# Patient Record
Sex: MALE | Race: BLACK OR AFRICAN AMERICAN | NOT HISPANIC OR LATINO | Employment: OTHER | URBAN - METROPOLITAN AREA
[De-identification: names, ages, dates, MRNs, and addresses within clinical notes are randomized per-mention and may not be internally consistent; named-entity substitution may affect disease eponyms.]

---

## 2021-05-29 ENCOUNTER — RECORDS - HEALTHEAST (OUTPATIENT)
Dept: ADMINISTRATIVE | Facility: CLINIC | Age: 61
End: 2021-05-29

## 2021-06-09 ENCOUNTER — RECORDS - HEALTHEAST (OUTPATIENT)
Dept: ADMINISTRATIVE | Facility: CLINIC | Age: 61
End: 2021-06-09

## 2022-07-07 ENCOUNTER — MEDICAL CORRESPONDENCE (OUTPATIENT)
Dept: HEALTH INFORMATION MANAGEMENT | Facility: CLINIC | Age: 62
End: 2022-07-07

## 2022-07-08 ENCOUNTER — TRANSCRIBE ORDERS (OUTPATIENT)
Dept: OTHER | Age: 62
End: 2022-07-08

## 2022-07-08 DIAGNOSIS — H40.1133 PRIMARY OPEN ANGLE GLAUCOMA (POAG) OF BOTH EYES, SEVERE STAGE: Primary | ICD-10-CM

## 2022-08-09 DIAGNOSIS — H40.003 GLAUCOMA SUSPECT OF BOTH EYES: Primary | ICD-10-CM

## 2022-08-11 ENCOUNTER — OFFICE VISIT (OUTPATIENT)
Dept: OPHTHALMOLOGY | Facility: CLINIC | Age: 62
End: 2022-08-11
Attending: OPHTHALMOLOGY
Payer: COMMERCIAL

## 2022-08-11 DIAGNOSIS — H40.1133 PRIMARY OPEN ANGLE GLAUCOMA (POAG) OF BOTH EYES, SEVERE STAGE: ICD-10-CM

## 2022-08-11 DIAGNOSIS — H25.12 AGE-RELATED NUCLEAR CATARACT OF LEFT EYE: Primary | ICD-10-CM

## 2022-08-11 DIAGNOSIS — H54.40 VISUAL LOSS, ONE EYE, NO LIGHT PERCEPTION (NLP): ICD-10-CM

## 2022-08-11 PROBLEM — H54.414A: Status: ACTIVE | Noted: 2021-08-31

## 2022-08-11 PROBLEM — H40.9: Status: ACTIVE | Noted: 2022-06-12

## 2022-08-11 PROBLEM — M10.9 GOUT: Status: ACTIVE | Noted: 2020-02-03

## 2022-08-11 PROCEDURE — 76512 OPH US DX B-SCAN: CPT | Performed by: OPHTHALMOLOGY

## 2022-08-11 PROCEDURE — 92004 COMPRE OPH EXAM NEW PT 1/>: CPT | Mod: GC | Performed by: OPHTHALMOLOGY

## 2022-08-11 PROCEDURE — G0463 HOSPITAL OUTPT CLINIC VISIT: HCPCS | Mod: 25

## 2022-08-11 PROCEDURE — 92133 CPTRZD OPH DX IMG PST SGM ON: CPT | Performed by: OPHTHALMOLOGY

## 2022-08-11 PROCEDURE — 92083 EXTENDED VISUAL FIELD XM: CPT | Performed by: OPHTHALMOLOGY

## 2022-08-11 RX ORDER — BRIMONIDINE TARTRATE 2 MG/ML
1 SOLUTION/ DROPS OPHTHALMIC 3 TIMES DAILY
COMMUNITY
End: 2022-08-11

## 2022-08-11 RX ORDER — BRIMONIDINE TARTRATE 2 MG/ML
1 SOLUTION/ DROPS OPHTHALMIC 3 TIMES DAILY
Qty: 5 ML | Refills: 3 | Status: SHIPPED | OUTPATIENT
Start: 2022-08-11 | End: 2022-10-21

## 2022-08-11 RX ORDER — TIMOLOL MALEATE 5 MG/ML
1 SOLUTION/ DROPS OPHTHALMIC 2 TIMES DAILY
Qty: 5 ML | Refills: 3 | Status: SHIPPED | OUTPATIENT
Start: 2022-08-11 | End: 2023-01-19

## 2022-08-11 RX ORDER — TIMOLOL MALEATE 5 MG/ML
1 SOLUTION/ DROPS OPHTHALMIC 2 TIMES DAILY
COMMUNITY
End: 2022-08-11

## 2022-08-11 RX ORDER — LATANOPROST 50 UG/ML
1 SOLUTION/ DROPS OPHTHALMIC DAILY
Qty: 2.5 ML | Refills: 3 | Status: SHIPPED | OUTPATIENT
Start: 2022-08-11 | End: 2023-01-19

## 2022-08-11 RX ORDER — ACETAZOLAMIDE 500 MG/1
500 CAPSULE, EXTENDED RELEASE ORAL 2 TIMES DAILY
Qty: 60 CAPSULE | Refills: 0 | Status: SHIPPED | OUTPATIENT
Start: 2022-08-11 | End: 2022-09-19

## 2022-08-11 RX ORDER — ACETAZOLAMIDE 500 MG/1
500 CAPSULE, EXTENDED RELEASE ORAL
COMMUNITY
Start: 2022-06-12 | End: 2022-08-11

## 2022-08-11 RX ORDER — LATANOPROST 50 UG/ML
1 SOLUTION/ DROPS OPHTHALMIC
COMMUNITY
Start: 2022-06-12 | End: 2022-08-11

## 2022-08-11 ASSESSMENT — CONF VISUAL FIELD
OD_SUPERIOR_NASAL_RESTRICTION: 1
OS_SUPERIOR_TEMPORAL_RESTRICTION: 1
OD_INFERIOR_NASAL_RESTRICTION: 1
OS_INFERIOR_TEMPORAL_RESTRICTION: 3
OD_SUPERIOR_TEMPORAL_RESTRICTION: 1
OD_INFERIOR_TEMPORAL_RESTRICTION: 1
OS_INFERIOR_NASAL_RESTRICTION: 1
OS_SUPERIOR_NASAL_RESTRICTION: 1

## 2022-08-11 ASSESSMENT — REFRACTION_WEARINGRX
OS_CYLINDER: +3.00
OS_AXIS: 165
OD_SPHERE: BALANCE
SPECS_TYPE: BIFOCAL
OS_ADD: +3.00
OS_SPHERE: -3.00

## 2022-08-11 ASSESSMENT — PACHYMETRY
OS_CT(UM): 523
OD_CT(UM): 553

## 2022-08-11 ASSESSMENT — VISUAL ACUITY
OD_SC: NLP
OS_SC: 20/40
CORRECTION_TYPE: GLASSES
METHOD: SNELLEN - LINEAR

## 2022-08-11 ASSESSMENT — TONOMETRY
OD_IOP_MMHG: 3
OD_IOP_MMHG: 3
OS_IOP_MMHG: 40
OS_IOP_MMHG: 34
IOP_METHOD: TONOPEN
IOP_METHOD: APPLANATION (AN)
OS_IOP_MMHG: 38
OD_IOP_MMHG: 6
IOP_METHOD: TONOPEN

## 2022-08-11 ASSESSMENT — SLIT LAMP EXAM - LIDS
COMMENTS: NORMAL
COMMENTS: NORMAL

## 2022-08-11 ASSESSMENT — EXTERNAL EXAM - RIGHT EYE: OD_EXAM: NORMAL

## 2022-08-11 ASSESSMENT — CUP TO DISC RATIO: OS_RATIO: 0.85

## 2022-08-11 ASSESSMENT — EXTERNAL EXAM - LEFT EYE: OS_EXAM: NORMAL

## 2022-08-11 NOTE — PATIENT INSTRUCTIONS
Latanoprost at bedtime each eye  Timolol twice a day in each eye   Brimonidine three times a day in each eye   Diamox 500 mg pill twice a day

## 2022-08-11 NOTE — PROGRESS NOTES
Chief Complaint/Presenting Concern: severe POAG each eye, referred by Dr. Kurtz    History of Present Illness:   Parvez Vásquez is a 61 year old patient who presents for evaluation of glaucoma.  Referred by Dr. Kurtz for evaluation of severe glaucoma in both eyes. Patient reports that he was diagnosed with glaucoma in both eyes about 15 years ago. Patient has a complicated history of glaucoma and cataract surgeries in the right eye and now has NLP vision in the right eye. left eye has been progressing over the years but patient is hesitant to have surgery in the left eye due to poor outcome after surgery in the right eye.     Relevant Past Medical/Family/Social History:  PMH: Non ischemic cardiopmyopathy, htn, AVNRT s/p debrillator  FH: non contributory     Relevant Review of Systems: none     Diagnosis: Primary open angle glaucoma  severe left eye   Year diagnosis: 2007  Previous glaucoma surgery/laser:  - Ahmed right eye (2011, Frank)  - CEIOL, lysis of iridocorneal adhesions, IFIS, - trabeculectomy/[facility] right eye (2012, Frank)  Maximum intraocular pressure: unknown/32 mmHg   Currently Meds: latanoprost, brimonidine, timolol  Family history: negative  CCT: 55/3 /523  Gonio: open to scleral spur 360;   Refractive status: low myope  Trauma history: negative  Steroid exposure: positive  Vasospastic disease: Migrane/Raynaud phenomenon: negative  A past hemodynamic crisis or Low BP: negative  Meds AEs/intolerance: unknown   PMHx: cardiomyopathy   Anticoagulants: none    Today's testing:  - IOP: 3/38 mmHg   - Visual field August 11, 2022  - Right eye - unable  - Left eye - generalized depression high FP/FN  - OCT Optic Nerve RNFL Spectralis August 11, 2022  - Right Eye: unable  - Left Eye: Diffuse RNFL thinning, floor effect    Additional Ocular History:     2. Cataract, left eye    3. PCIOL right eye, sulcus lens  - surgery complicated by IFIS and iris prolapse    4. NLP right eye   Total RD      Plan/Recommendations:    Discussed findings with patient.    Advanced glaucoma left eye with uncontrolled IOP, recommend BGI tube surgery. Will avoid cataract surgery at time of glaucoma surgery for now given history of complication in the right eye,. Plan for cataract surgery after IOP is well controlled. Will decrease IOP with Diamox prior to surgery to avoid complications.    Long discussion with the patient on the risk of complete vision loss from uncontrolled IOP.     Risks and benefits of Baerveldt tube surgery in the left eye, including risks of bleeding, infection, need for additional surgery, failure of surgery, and vision loss were explained to patient, who expressed understanding and wishes to proceed with surgery    Start Diamox 500 mg BID     Continue current medications:   o Latanoprost at bedtime each eye  o Timolol BID each eye  (refills given)  o Brimonidine TID each eye (refills given)    RTC in 1 week VA, IOP   Schedule BGI tube surgery left eye in 2 weeks, consider GA    Jeovanny Esposito MD  Resident Physician, PGY-3  Department of Ophthalmology  08/11/22 1:54 PM      Physician Attestation     Attending Physician Attestation:  Complete documentation of historical and exam elements from today's encounter can be found in the full encounter summary report (not reduplicated in this progress note). I personally obtained the chief complaint(s) and history of present illness. I confirmed and edited as necessary the review of systems, past medical/surgical history, family history, social history, and examination findings as documented by others; and I examined the patient myself. I personally reviewed the relevant tests, images, and reports as documented above. I personally reviewed the ophthalmic test(s) associated with this encounter, agree with the interpretation(s) as documented by the resident/fellow and have edited the corresponding report(s) as necessary. I formulated and edited as necessary  the assessment and plan and discussed the findings and management plan with the patient and any family members present at the time of the visit.  Carrillo Patel M.D., Glaucoma, August 11, 2022

## 2022-08-11 NOTE — NURSING NOTE
Chief Complaints and History of Present Illnesses   Patient presents with     Glaucoma Evaluation     Chief Complaint(s) and History of Present Illness(es)     Glaucoma Evaluation     Laterality: both eyes    Associated symptoms: floaters (red floater intermittently).  Negative for eye pain, redness, pain with eye movement, headache and flashes    Treatment side effects: none    Pain scale: 0/10              Comments     New pt here today for Glaucoma assessment.  States he is hoping provider will be able to help him save his LE.  No pain or discomfort in eyes.    Ocular meds = needs refills for all - has bottles with.  **added them to med list as pt reported.    acetaZOLAMIDE (DIAMOX SEQUEL) 500 MG 12 hr capsule  brimonidine (ALPHAGAN) 0.2 % ophthalmic solution  latanoprost (XALATAN) 0.005 % ophthalmic solution  timolol maleate (TIMOPTIC) 0.5 % ophthalmic solution    Lakia Mayo COA, SHELLIE 1:20 PM 08/11/2022

## 2022-08-12 ENCOUNTER — TELEPHONE (OUTPATIENT)
Dept: OPHTHALMOLOGY | Facility: CLINIC | Age: 62
End: 2022-08-12

## 2022-08-12 ENCOUNTER — PREP FOR PROCEDURE (OUTPATIENT)
Dept: OPHTHALMOLOGY | Facility: CLINIC | Age: 62
End: 2022-08-12

## 2022-08-12 DIAGNOSIS — H40.1123 PRIMARY OPEN ANGLE GLAUCOMA (POAG) OF LEFT EYE, SEVERE STAGE: Primary | ICD-10-CM

## 2022-08-12 RX ORDER — PROPARACAINE HYDROCHLORIDE 5 MG/ML
1 SOLUTION/ DROPS OPHTHALMIC ONCE
Status: CANCELLED | OUTPATIENT
Start: 2022-08-12 | End: 2022-08-12

## 2022-08-12 NOTE — TELEPHONE ENCOUNTER
Health Call Center    Phone Message    May a detailed message be left on voicemail: yes     Reason for Call: Medication Question or concern regarding medication   Prescription Clarification  Name of Medication:   acetaZOLAMIDE (DIAMOX SEQUEL) 500 MG 12 hr capsule  brimonidine (ALPHAGAN) 0.2 % ophthalmic solution  latanoprost (XALATAN) 0.005 % ophthalmic solution  timolol maleate (TIMOPTIC) 0.5 % ophthalmic solution  Prescribing Provider: Dr Patel   Pharmacy: [id number] - [place], [place] - [address] AT [address] [facility]   What on the order needs clarification?   Pt caretaker Mohini called to state none of the medications dispersed yesterday are covered by insurance and was wondering if there is anything else that can be prescribed instead. Please call with any questions. Thank you.       Action Taken: Message routed to:  [facility] EYE    Travel Screening: Not Applicable

## 2022-08-13 NOTE — TELEPHONE ENCOUNTER
I called Mohini back this morning and she stated that the patient was able to figure out insurance issues and got the drops and pills. She was on her way to see the patient so I gave her a direct contact number and asked her to call back in case there were still any ongoing issues.

## 2022-08-15 ENCOUNTER — PREP FOR PROCEDURE (OUTPATIENT)
Dept: OPHTHALMOLOGY | Facility: CLINIC | Age: 62
End: 2022-08-15

## 2022-08-15 ENCOUNTER — OFFICE VISIT (OUTPATIENT)
Dept: OPHTHALMOLOGY | Facility: CLINIC | Age: 62
End: 2022-08-15
Attending: OPHTHALMOLOGY
Payer: COMMERCIAL

## 2022-08-15 DIAGNOSIS — H40.1123 PRIMARY OPEN ANGLE GLAUCOMA (POAG) OF LEFT EYE, SEVERE STAGE: ICD-10-CM

## 2022-08-15 DIAGNOSIS — H54.40 VISUAL LOSS, ONE EYE, NO LIGHT PERCEPTION (NLP): ICD-10-CM

## 2022-08-15 DIAGNOSIS — H40.1133 PRIMARY OPEN ANGLE GLAUCOMA (POAG) OF BOTH EYES, SEVERE STAGE: Primary | ICD-10-CM

## 2022-08-15 DIAGNOSIS — H25.12 AGE-RELATED NUCLEAR CATARACT OF LEFT EYE: ICD-10-CM

## 2022-08-15 PROCEDURE — G0463 HOSPITAL OUTPT CLINIC VISIT: HCPCS

## 2022-08-15 PROCEDURE — 92134 CPTRZ OPH DX IMG PST SGM RTA: CPT | Performed by: OPHTHALMOLOGY

## 2022-08-15 PROCEDURE — 99214 OFFICE O/P EST MOD 30 MIN: CPT | Mod: GC | Performed by: OPHTHALMOLOGY

## 2022-08-15 ASSESSMENT — CONF VISUAL FIELD
OD_INFERIOR_NASAL_RESTRICTION: 1
OS_SUPERIOR_NASAL_RESTRICTION: 1
OD_INFERIOR_TEMPORAL_RESTRICTION: 1
OS_SUPERIOR_TEMPORAL_RESTRICTION: 1
OS_INFERIOR_NASAL_RESTRICTION: 1
OS_INFERIOR_TEMPORAL_RESTRICTION: 3
OD_SUPERIOR_NASAL_RESTRICTION: 1
OD_SUPERIOR_TEMPORAL_RESTRICTION: 1

## 2022-08-15 ASSESSMENT — SLIT LAMP EXAM - LIDS
COMMENTS: NORMAL
COMMENTS: NORMAL

## 2022-08-15 ASSESSMENT — TONOMETRY
OD_IOP_MMHG: 06
OS_IOP_MMHG: 11
IOP_METHOD: APPLANATION
IOP_METHOD: APPLANATION
OS_IOP_MMHG: 10
OD_IOP_MMHG: 01

## 2022-08-15 ASSESSMENT — EXTERNAL EXAM - RIGHT EYE: OD_EXAM: NORMAL

## 2022-08-15 ASSESSMENT — CUP TO DISC RATIO: OS_RATIO: 0.85

## 2022-08-15 ASSESSMENT — VISUAL ACUITY
METHOD: SNELLEN - LINEAR
OS_SC: 20/100
OS_PH_SC: 20/80
OD_SC: NLP

## 2022-08-15 ASSESSMENT — EXTERNAL EXAM - LEFT EYE: OS_EXAM: NORMAL

## 2022-08-15 NOTE — PROGRESS NOTES
Chief Complaint/Presenting Concern: severe POAG each eye, referred by Dr. Kurtz    History of Present Illness:   Parvez Vásquez is a 61 year old patient who presents for evaluation of glaucoma.  Referred by Dr. Kurtz for evaluation of severe glaucoma in both eyes. Patient reports that he was diagnosed with glaucoma in both eyes about 15 years ago. Patient has a complicated history of glaucoma and cataract surgeries in the right eye and now has NLP vision in the right eye. left eye has been progressing over the years but patient is hesitant to have surgery in the left eye due to poor outcome after surgery in the right eye.     Patient was started on Diamox last week and reports using the pills and tolerating well.     Relevant Past Medical/Family/Social History:  PMH: Non ischemic cardiopmyopathy, htn, AVNRT s/p debrillator  FH: non contributory     Relevant Review of Systems: none     Diagnosis: Primary open angle glaucoma  severe left eye   Year diagnosis: 2007  Previous glaucoma surgery/laser:  - Ahmed right eye (2011, [place])  - CEIOL, lysis of iridocorneal adhesions, IFIS, - trabeculectomy/[facility] right eye (2012, Frank)  Maximum intraocular pressure: unknown/32 mmHg   Family history: negative  CCT: 55/3 /523  Gonio: open to scleral spur 360;   Refractive status: low myope  Trauma history: negative  Steroid exposure: positive  Vasospastic disease: Migrane/Raynaud phenomenon: negative  A past hemodynamic crisis or Low BP: negative  Meds AEs/intolerance: unknown   PMHx: cardiomyopathy   Anticoagulants: none    Today's testing:  - IOP 6/10 mmHg   - Visual field August 11, 2022  - Right eye - unable  - Left eye - generalized depression high FP/FN  - OCT Macula August 11, 2022  - Right Eye: unable  - Left Eye: no subretinal or intraretinal fluid, complete PVD  - OCT Optic Nerve RNFL Spectralis August 11, 2022  - Right Eye: unable  - Left Eye: Diffuse RNFL thinning, floor effect    Additional Ocular History:     2.  Cataract, left eye    3. PCIOL right eye, sulcus lens  - surgery complicated by IFIS and iris prolapse    4. NLP right eye   Total RD     Plan/Recommendations:    Discussed findings with patient.    Advanced glaucoma left eye with uncontrolled IOP needing oral Diamox with evidence of progression over time. Recommend BGI tube surgery. Will avoid cataract surgery at time of glaucoma surgery for now given history of complication in the right eye,. Plan for cataract surgery after IOP is well controlled. Will decrease IOP with Diamox prior to surgery to avoid complications.    Long discussion with the patient on the risk of complete vision loss from uncontrolled IOP.     Risks and benefits of Baerveldt tube surgery in the left eye, including risks of bleeding, infection, need for additional surgery, failure of surgery, and vision loss were explained to patient, who expressed understanding and wishes to proceed with surgery    Continue Diamox 500 mg BID, good response      Continue current medications:   o Latanoprost at bedtime each eye  o Timolol BID each eye  (refills given)  o Brimonidine TID each eye (refills given)    Schedule BGI tube surgery left eye in 2-3 weeks, MARIANNA Esposito MD  Resident Physician, PGY-3  Department of Ophthalmology  08/11/22 1:54 PM      Physician Attestation     Attending Physician Attestation:  Complete documentation of historical and exam elements from today's encounter can be found in the full encounter summary report (not reduplicated in this progress note). I personally obtained the chief complaint(s) and history of present illness. I confirmed and edited as necessary the review of systems, past medical/surgical history, family history, social history, and examination findings as documented by others; and I examined the patient myself. I personally reviewed the relevant tests, images, and reports as documented above. I formulated and edited as necessary the assessment and plan  and discussed the findings and management plan with the patient and any family members present at the time of the visit.  Carrillo Patel M.D., Glaucoma, August 15, 2022

## 2022-08-15 NOTE — NURSING NOTE
Chief Complaints and History of Present Illnesses   Patient presents with     Glaucoma Follow-Up     Chief Complaint(s) and History of Present Illness(es)     Glaucoma Follow-Up     Laterality: both eyes              Comments     Pt. States that he is doing well. No change in VA BE. No pain BE. No flashes or floaters BE.   Monica Juarez COT 12:53 PM August 15, 2022

## 2022-09-08 ENCOUNTER — TELEPHONE (OUTPATIENT)
Dept: OPHTHALMOLOGY | Facility: CLINIC | Age: 62
End: 2022-09-08

## 2022-09-08 ENCOUNTER — HOSPITAL ENCOUNTER (OUTPATIENT)
Facility: CLINIC | Age: 62
End: 2022-09-08
Attending: OPHTHALMOLOGY | Admitting: OPHTHALMOLOGY
Payer: COMMERCIAL

## 2022-09-08 NOTE — TELEPHONE ENCOUNTER
Patient is scheduled for surgery with Dr. Carrillo Patel     Spoke with: Parvez     Date of Surgery: 10/10     [place] [facility] [place]  [address] [place] [id number]     H&P will be completed at: [facility]     COVID testing: [facility]    Post Op scheduled on 10/11, 10/17, and 11/07     Surgery packet was mailed 09/08     Additional comments: Advised RN will call 1 - 3 business days prior with arrival time and instructions. Informed patient they will need an adult  and responsible adult to stay with for 24 hours following surgery

## 2022-09-09 NOTE — TELEPHONE ENCOUNTER
FUTURE VISIT INFORMATION      SURGERY INFORMATION:    Date: 10/10/22    Location: ur or    Surgeon:  Carrillo Patel MD    Anesthesia Type:  general    Procedure: INSERTION, BAERVELDT  IMPLANT, EYE    Consult: ov 8/15    RECORDS REQUESTED FROM:       Primary Care Provider: Giselle Coker MD - [facility]    Pertinent Medical History: SHELLIE, hypertension, non-ischemic cardiomyopathy     Most recent EKG+ Tracin21- [facility]

## 2022-09-19 ENCOUNTER — TELEPHONE (OUTPATIENT)
Dept: OPHTHALMOLOGY | Facility: CLINIC | Age: 62
End: 2022-09-19

## 2022-09-19 DIAGNOSIS — H40.1133 PRIMARY OPEN ANGLE GLAUCOMA (POAG) OF BOTH EYES, SEVERE STAGE: ICD-10-CM

## 2022-09-19 RX ORDER — ACETAZOLAMIDE 500 MG/1
500 CAPSULE, EXTENDED RELEASE ORAL 2 TIMES DAILY
Qty: 60 CAPSULE | Refills: 0 | Status: SHIPPED | OUTPATIENT
Start: 2022-09-19 | End: 2022-09-22

## 2022-09-19 NOTE — TELEPHONE ENCOUNTER
Health Call Center    Phone Message    May a detailed message be left on voicemail: No    Reason for Call: Medication Refill Request    Has the patient contacted the pharmacy for the refill? Yes   Name of medication being requested: acetaZOLAMIDE 500 MG TAKE 1 CAPSULE(500 MG) BY MOUTH TWICE DAILY  Provider who prescribed the medication: Jeovanny Esposito MD, Dr Patel  Pharmacy: [facility] #[id number] - [place], [place] - [address] AT [address] [facility]  Date medication is needed: ASAP    Patient wants to stress to us that patient is completely out of his medication for the last 3 days and would like us to refill it ASAP. Pharmacy told patient that we are denying his medication. Patients states he has new insurance now therefore there shouldn't be any issues. Patient is blind and would not like to leave a message as he cannot tell. Patient would like us to keep trying to get a hold of him if he doesn't . Thank you.     Action Taken: Message routed to:  [facility] Eye    Travel Screening: Not Applicable

## 2022-09-19 NOTE — TELEPHONE ENCOUNTER
acetaZOLAMIDE 500 MG TAKE 1 CAPSULE(500 MG) BY MOUTH TWICE DAILY      Last Written Prescription Date:  8/11/22  Last Fill Quantity: 60,   # refills: 0  Last Office Visit : 8/15/22  Future Office visit:  10/11/22-surgery    Routing refill request to provider for review/approval because:    Last note states... Will decrease IOP with Diamox prior to surgery to avoid complications. Surgery scheduled 10/11/22

## 2022-09-20 NOTE — TELEPHONE ENCOUNTER
[facility] Call Center    Phone Message    May a detailed message be left on voicemail: yes     Reason for Call: Other: Pharmacy states the patient has Medicaid and Jose Rafael Ovlale MD is not part of Medicaid to sign for this prescription. Please update Rx and resend.  Thank you.    Action Taken: Message routed to:  [facility] Ophthalmology    Travel Screening: Not Applicable

## 2022-09-21 ENCOUNTER — TELEPHONE (OUTPATIENT)
Dept: OPHTHALMOLOGY | Facility: CLINIC | Age: 62
End: 2022-09-21

## 2022-09-21 DIAGNOSIS — H40.1133 PRIMARY OPEN ANGLE GLAUCOMA (POAG) OF BOTH EYES, SEVERE STAGE: ICD-10-CM

## 2022-09-21 RX ORDER — ACETAZOLAMIDE 500 MG/1
CAPSULE, EXTENDED RELEASE ORAL
Qty: 60 CAPSULE | Refills: 0 | OUTPATIENT
Start: 2022-09-21

## 2022-09-21 NOTE — TELEPHONE ENCOUNTER
Frazier, Neil, RN  p Med Refills Team 4 hours ago (7:34 AM)     MR    Hello,     Rx was signed off by resident 2 days ago-- only replace option for order on encounter.     Are you able to remove to close encounter.     Thank you     Neil Frazier RN 7:33 AM 09/21/22    Routing comment      acetaZOLAMIDE (DIAMOX SEQUEL) 500 MG 12 hr capsule 60 capsule 0 9/19/2022  No   Sig - Route: Take 1 capsule (500 mg) by mouth 2 times daily - Oral   Sent to pharmacy as: acetaZOLAMIDE  MG Oral Capsule Extended Release 12 Hour (DIAMOX SEQUEL)   Class: E-Prescribe   Order: [id number]   E-Prescribing Status: Receipt confirmed by pharmacy (9/19/2022  8:01 PM CDT)       Printout Tracking    External Result Report     Pharmacy    [facility] #[id number] - [place], [place] - [address] AT [address] OF [facility]

## 2022-09-22 RX ORDER — ACETAZOLAMIDE 500 MG/1
500 CAPSULE, EXTENDED RELEASE ORAL 2 TIMES DAILY
Qty: 60 CAPSULE | Refills: 1 | Status: ON HOLD | OUTPATIENT
Start: 2022-09-22 | End: 2022-12-09

## 2022-09-30 ENCOUNTER — TRANSFERRED RECORDS (OUTPATIENT)
Dept: HEALTH INFORMATION MANAGEMENT | Facility: CLINIC | Age: 62
End: 2022-09-30

## 2022-09-30 ENCOUNTER — MEDICAL CORRESPONDENCE (OUTPATIENT)
Dept: HEALTH INFORMATION MANAGEMENT | Facility: CLINIC | Age: 62
End: 2022-09-30

## 2022-10-06 ENCOUNTER — PRE VISIT (OUTPATIENT)
Dept: SURGERY | Facility: CLINIC | Age: 62
End: 2022-10-06

## 2022-10-06 ENCOUNTER — OFFICE VISIT (OUTPATIENT)
Dept: SURGERY | Facility: CLINIC | Age: 62
End: 2022-10-06
Payer: COMMERCIAL

## 2022-10-06 ENCOUNTER — TELEPHONE (OUTPATIENT)
Dept: OPHTHALMOLOGY | Facility: CLINIC | Age: 62
End: 2022-10-06

## 2022-10-06 ENCOUNTER — LAB (OUTPATIENT)
Dept: LAB | Facility: CLINIC | Age: 62
End: 2022-10-06
Payer: COMMERCIAL

## 2022-10-06 VITALS
BODY MASS INDEX: 29.53 KG/M2 | TEMPERATURE: 97.9 F | WEIGHT: 199.4 LBS | DIASTOLIC BLOOD PRESSURE: 94 MMHG | HEIGHT: 69 IN | HEART RATE: 83 BPM | SYSTOLIC BLOOD PRESSURE: 143 MMHG | RESPIRATION RATE: 16 BRPM | OXYGEN SATURATION: 96 %

## 2022-10-06 DIAGNOSIS — Z01.818 PREOP EXAMINATION: ICD-10-CM

## 2022-10-06 DIAGNOSIS — H40.1123 PRIMARY OPEN ANGLE GLAUCOMA (POAG) OF LEFT EYE, SEVERE STAGE: ICD-10-CM

## 2022-10-06 DIAGNOSIS — Z01.818 PREOP EXAMINATION: Primary | ICD-10-CM

## 2022-10-06 DIAGNOSIS — Z20.822 ENCOUNTER FOR LABORATORY TESTING FOR COVID-19 VIRUS: ICD-10-CM

## 2022-10-06 LAB
ANION GAP SERPL CALCULATED.3IONS-SCNC: 10 MMOL/L (ref 7–15)
BASOPHILS # BLD AUTO: 0 10E3/UL (ref 0–0.2)
BASOPHILS NFR BLD AUTO: 1 %
BUN SERPL-MCNC: 28.8 MG/DL (ref 8–23)
CALCIUM SERPL-MCNC: 9.4 MG/DL (ref 8.8–10.2)
CHLORIDE SERPL-SCNC: 109 MMOL/L (ref 98–107)
CREAT SERPL-MCNC: 1.42 MG/DL (ref 0.67–1.17)
DEPRECATED HCO3 PLAS-SCNC: 18 MMOL/L (ref 22–29)
EOSINOPHIL # BLD AUTO: 0.2 10E3/UL (ref 0–0.7)
EOSINOPHIL NFR BLD AUTO: 3 %
ERYTHROCYTE [DISTWIDTH] IN BLOOD BY AUTOMATED COUNT: 14.9 % (ref 10–15)
GFR SERPL CREATININE-BSD FRML MDRD: 56 ML/MIN/1.73M2
GLUCOSE SERPL-MCNC: 76 MG/DL (ref 70–99)
HCT VFR BLD AUTO: 45.9 % (ref 40–53)
HGB BLD-MCNC: 14.9 G/DL (ref 13.3–17.7)
IMM GRANULOCYTES # BLD: 0 10E3/UL
IMM GRANULOCYTES NFR BLD: 0 %
LYMPHOCYTES # BLD AUTO: 2.1 10E3/UL (ref 0.8–5.3)
LYMPHOCYTES NFR BLD AUTO: 33 %
MCH RBC QN AUTO: 29.4 PG (ref 26.5–33)
MCHC RBC AUTO-ENTMCNC: 32.5 G/DL (ref 31.5–36.5)
MCV RBC AUTO: 91 FL (ref 78–100)
MONOCYTES # BLD AUTO: 0.7 10E3/UL (ref 0–1.3)
MONOCYTES NFR BLD AUTO: 12 %
NEUTROPHILS # BLD AUTO: 3.2 10E3/UL (ref 1.6–8.3)
NEUTROPHILS NFR BLD AUTO: 51 %
NRBC # BLD AUTO: 0 10E3/UL
NRBC BLD AUTO-RTO: 0 /100
PLATELET # BLD AUTO: 279 10E3/UL (ref 150–450)
POTASSIUM SERPL-SCNC: 4.1 MMOL/L (ref 3.4–5.3)
RBC # BLD AUTO: 5.07 10E6/UL (ref 4.4–5.9)
SARS-COV-2 RNA RESP QL NAA+PROBE: NEGATIVE
SODIUM SERPL-SCNC: 137 MMOL/L (ref 136–145)
WBC # BLD AUTO: 6.2 10E3/UL (ref 4–11)

## 2022-10-06 PROCEDURE — 99000 SPECIMEN HANDLING OFFICE-LAB: CPT | Performed by: PATHOLOGY

## 2022-10-06 PROCEDURE — U0003 INFECTIOUS AGENT DETECTION BY NUCLEIC ACID (DNA OR RNA); SEVERE ACUTE RESPIRATORY SYNDROME CORONAVIRUS 2 (SARS-COV-2) (CORONAVIRUS DISEASE [COVID-19]), AMPLIFIED PROBE TECHNIQUE, MAKING USE OF HIGH THROUGHPUT TECHNOLOGIES AS DESCRIBED BY CMS-2020-01-R: HCPCS | Mod: 90 | Performed by: PATHOLOGY

## 2022-10-06 PROCEDURE — 99205 OFFICE O/P NEW HI 60 MIN: CPT | Performed by: PHYSICIAN ASSISTANT

## 2022-10-06 PROCEDURE — U0005 INFEC AGEN DETEC AMPLI PROBE: HCPCS | Mod: 90 | Performed by: PATHOLOGY

## 2022-10-06 PROCEDURE — 85025 COMPLETE CBC W/AUTO DIFF WBC: CPT | Performed by: PATHOLOGY

## 2022-10-06 PROCEDURE — 36415 COLL VENOUS BLD VENIPUNCTURE: CPT | Performed by: PATHOLOGY

## 2022-10-06 PROCEDURE — 80048 BASIC METABOLIC PNL TOTAL CA: CPT | Performed by: PATHOLOGY

## 2022-10-06 RX ORDER — GABAPENTIN 100 MG/1
100 CAPSULE ORAL 3 TIMES DAILY
COMMUNITY
Start: 2022-09-28 | End: 2024-04-04

## 2022-10-06 RX ORDER — TAMSULOSIN HYDROCHLORIDE 0.4 MG/1
0.8 CAPSULE ORAL EVERY MORNING
COMMUNITY
Start: 2022-09-28 | End: 2023-09-28

## 2022-10-06 ASSESSMENT — ENCOUNTER SYMPTOMS: SEIZURES: 0

## 2022-10-06 ASSESSMENT — PAIN SCALES - GENERAL: PAINLEVEL: SEVERE PAIN (7)

## 2022-10-06 ASSESSMENT — LIFESTYLE VARIABLES: TOBACCO_USE: 0

## 2022-10-06 NOTE — PATIENT INSTRUCTIONS
Preparing for Your Surgery      Name:  Parvez Vásquez   MRN:  [id number]   :  1960   Today's Date:  10/6/2022       Arriving for surgery:  Surgery date:  10/10/22  Arrival time:  5:30 am     Surgeries and procedures: Adult patients can have 2 visitors all through the surgery process.     Visiting hours: 8 a.m. to 8:30 p.m.     Hospital: Adult patients and children under age 18 can have 4 visitor at a time     No visitors under the age of 5 are allowed for hospital patients.  Double occupancy rooms: Patients can have only two visitors at a time.     Patients with disabilities: Can have a support person with them (family member, service provider     Or someone well informed about their needs) plus the allowed number of visitors     Patients confirmed or suspected to have symptoms of COVID 19 or flu:     No visitors allowed for adult patients.   Children (under age 18) can have 1 named visitor.     People who are sick or showing symptoms of COVID 19 or flu:    Are not allowed to visit patients--we can only make exceptions in special situations.       Please follow these guidelines for your visit:   Arrive wearing a mask over your mouth and nose; we will give you a medical mask to wear    If you arrive wearing a cloth mask.   Keep it on during your entire visit, even when in patient's room.   If you don't wear a mask we'll ask you to leave.     Clean your hands with alcohol hand . Do this when you arrive at and leave the building and patient room,    And again after you touch your mask or anything in the room.     You can t visit if you have a fever, cough, shortness of breath, muscle aches, headaches, sore throat    Or diarrhea      Stay 6 feet away from others during your visit and between visits     Go directly to and from the room you are visiting.     Stay in the patient s room during your visit. Limit going to other places in the hospital as much as possible     Leave bags and jackets at home  or in the car.     For everyone s health, please don t come and go during your visit. That includes for smoking   during your visit.     Please come to:     [facility] [address]  [address] [place]  [address]    - parking is available in front of [facility] from 5:15AM to 8:00PM. If you prefer, park your car in the Green Lot.    -Proceed to the 3rd floor, check in at the Adult Surgery Waiting Lounge. [phone]    If an escort is needed stop at the Information Desk in the lobby. Inform the information person that you are here for surgery. An escort to the Adult Surgery Waiting Lounge will be provided.    What can I eat or drink?  -  You may eat and drink normally for up to 8 hours before your surgery. (Until 11:30 pm)  -  You may have clear liquids until 2 hours before your surgery (Until 5:30 am)    Examples of clear liquids:  Water  Clear broth  Juices (apple, white grape, white cranberry  and cider) without pulp  Noncarbonated, powder based beverages  (lemonade and Kennedy-Aid)  Sodas (Sprite, 7-Up, ginger ale and seltzer)  Coffee or tea (without milk or cream)  Gatorade    -  No Alcohol for at least 24 hours before surgery.     Which medicines can I take?    Hold Aspirin for 7 days before surgery.   Hold Multivitamins for 7 days before surgery.  Hold Supplements for 7 days before surgery.  Hold Ibuprofen (Advil, Motrin) for 1 day before surgery--unless otherwise directed by surgeon.  Hold Naproxen (Aleve) for 4 days before surgery.    Hold all NSAIDS for 7 days before spine surgery. (Ibuprofen, Naproxen, Celebrex, Indocin, Diclofenac.)    -  DO NOT take these medications the day of surgery:  Not applicable    -  PLEASE TAKE these medications the day of surgery:  Acetazolamide (Diamox)  Eye drops per surgeon's instructions  Gabapentin (Neurontin)  Tamsulosin (Flomax)    How do I prepare myself?  - Please take 2 showers before  surgery using Scrubcare or Hibiclens soap.    Use this soap only from the neck to your toes.     Leave the soap on your skin for one minute--then rinse thoroughly.      You may use your own shampoo and conditioner. No other hair products.   - Please remove all jewelry and body piercings.  - No lotions, deodorants or fragrance.  - No makeup or fingernail polish.   - Bring your ID and insurance card.    ALL PATIENTS GOING HOME THE SAME DAY OF SURGERY ARE REQUIRED TO HAVE A RESPONSIBLE ADULT TO DRIVE AND BE IN ATTENDANCE WITH THEM FOR 24 HOURS FOLLOWING SURGERY.      Questions or Concerns:    - For any questions regarding the day of surgery or your hospital stay, please contact the Pre Admission Nursing Office at [phone].       - If you have health changes between today and your surgery, please call your surgeon.       - For questions after surgery, please call your surgeons office.

## 2022-10-06 NOTE — H&P
Pre-Operative H & P     CC:  Preoperative exam to assess for increased cardiopulmonary risk while undergoing surgery and anesthesia.    Date of Encounter: 10/6/2022  Primary Care Physician:  No Ref-Primary, Physician     Reason for Visit: Primary open angle glaucoma (POAG) of left eye, severe stage     HPI  Parvez Vásquez is a 62 year old male who presents for pre-operative H & P in preparation for  Procedure Information     Case: [id number] Date/Time: 10/10/22 0730    Procedure: LEFT INSERTION, BAERVELDT  IMPLANT, EYE (Left Eye)    Anesthesia type: General    Diagnosis: Primary open angle glaucoma (POAG) of left eye, severe stage [H40.1123]    Pre-op diagnosis: Primary open angle glaucoma (POAG) of left eye, severe stage [H40.1123]    Location:  OR 05 / [facility]    Providers: Carrillo Patel MD          Patient is being evaluated for comorbid conditions of HTN, PAD, NICM, s/p ICD implant, HLD, SHELLIE, urinary retention, obesity, GERD, gout.      Mr. Vásquez was diagnosed with glaucoma in both eyes about 15 years ago. Patient has a complicated history of glaucoma and cataract surgeries in the right eye and now has NLP vision in the right eye. Left eye has been progressing over the years but patient is hesitant to have surgery in the left eye due to poor outcome after surgery in the right eye. He is taking Diamox. He now presents for the above procedure.      History was obtained from patient & chart review. He is accompanied by his cousin, Mohini.        Hx of abnormal bleeding or anti-platelet use: denies      Past Medical History  Past Medical History:   Diagnosis Date     Gastroesophageal reflux disease without esophagitis      Gout      HLD (hyperlipidemia)      HTN (hypertension)      ICD (implantable cardioverter-defibrillator) in place      SHELLIE (obstructive sleep apnea)      PAD (peripheral artery disease) (H)      Primary open angle glaucoma (POAG) of both eyes      Urinary retention        Past Surgical  "History  Past Surgical History:   Procedure Laterality Date     EP INSERT ICD       EYE SURGERY Right        Prior to Admission Medications  Current Outpatient Medications   Medication Sig Dispense Refill     acetaZOLAMIDE (DIAMOX SEQUEL) 500 MG 12 hr capsule Take 1 capsule (500 mg) by mouth 2 times daily 60 capsule 1     brimonidine (ALPHAGAN) 0.2 % ophthalmic solution Place 1 drop Into the left eye 3 times daily (Patient taking differently: Place 1 drop Into the left eye 2 times daily) 5 mL 3     gabapentin (NEURONTIN) 100 MG capsule Take 100 mg by mouth 3 times daily       latanoprost (XALATAN) 0.005 % ophthalmic solution Place 1 drop into both eyes daily (Patient taking differently: Place 1 drop into both eyes every evening) 2.5 mL 3     tamsulosin (FLOMAX) 0.4 MG capsule Take 0.8 mg by mouth every morning       timolol maleate (TIMOPTIC) 0.5 % ophthalmic solution Place 1 drop into both eyes 2 times daily 5 mL 3       Allergies  Allergies   Allergen Reactions     Fentanyl Anaphylaxis     Midazolam Anaphylaxis     Cilostazol Other (See Comments)     Says made him feel \"high\"       Social History  Social History     Socioeconomic History     Marital status: Legally      Spouse name: Not on file     Number of children: Not on file     Years of education: Not on file     Highest education level: Not on file   Occupational History     Not on file   Tobacco Use     Smoking status: Never Smoker     Smokeless tobacco: Never Used   Substance and Sexual Activity     Alcohol use: Not Currently     Drug use: Never     Sexual activity: Not on file   Other Topics Concern     Not on file   Social History Narrative     Not on file     Social Determinants of Health     Financial Resource Strain: Not on file   Food Insecurity: Not on file   Transportation Needs: Not on file   Physical Activity: Not on file   Stress: Not on file   Social Connections: Not on file   Intimate Partner Violence: Not on file   Housing " Stability: Not on file       Family History  Family History   Problem Relation Age of Onset     Anesthesia Reaction No family hx of      Deep Vein Thrombosis (DVT) No family hx of      Cardiovascular No family hx of        Review of Systems  The complete review of systems is negative other than noted in the HPI or here.     Anesthesia Evaluation   Pt has had prior anesthetic.     No history of anesthetic complications       ROS/MED HX  ENT/Pulmonary: Comment: Hasn't used CPAP in years, per pt      (+) sleep apnea, doesn't use CPAP,  (-) tobacco use and asthma   Neurologic: Comment: B/L glaucoma diagnosed 15 yrs ago. S/P cataract surgeries in the right eye; now has NLP vision in the right eye.         (-) no seizures and no CVA   Cardiovascular: Comment: [facility] last seen [facility]/2021. ICD last interrogated 6/24/22.    Hx NICM, likely due to prior ETOH.    (+) Dyslipidemia -Peripheral Vascular Disease----ICD Reason placed:NICM.  type;Medtronic Previous cardiac testing   Echo: Date: 2014 Results:  Sinus rhythm during study.    Normal LV size and systolic function.    Mild concentric LVH.    Upper normal RV chamber size with low normal systolic function.    Mild LA dilatation.     Mild RA dilatation.     Mildly dilated aortic root.     Mildly dilated ascending aorta.     Compared to report of study dated 7/1/2011, no significant interval     changes are noted.     Left Ventricular Ejection Fraction: 55 %       Stress Test:  Date: Results:    ECG Reviewed:  Date: 6/11/22 Results:  Sinus rhythm  Possible Left atrial enlargement  ST elevation, consider early repolarization, pericarditis, or injury  Nonspecific ST and T wave abnormality  Abnormal ECG  When compared with ECG of 14-MAR-2022 13:44,  No significant change was found    Ventricular rate 75 bpm     Cath:  Date: Results:      METS/Exercise Tolerance: 1 - Eating, dressing Comment: Unable to walk 1-2 blocks due to pain on soles of feet &  "visual impairment. Denies CP. Uses stairs daily at home.     Hematologic:  - neg hematologic  ROS  (-) history of blood clots and history of blood transfusion   Musculoskeletal: Comment: Gout    (+) arthritis,     GI/Hepatic:  - neg GI/hepatic ROS  (-) GERD and liver disease   Renal/Genitourinary: Comment: Urinary retention, Followed at [facility]. Recommended instructions for self cath, but pt declined due to discomfort with catheterization and partial blindness. Taking Flomax and finasteride. Retention likely due to BPH.       (+) renal disease, type: CRI, Pt does not require dialysis,     Endo:     (+) Obesity,  (-) Type II DM   Psychiatric/Substance Use: Comment: Needle phobia      (+) psychiatric history anxiety     Infectious Disease:  - neg infectious disease ROS     Malignancy:  - neg malignancy ROS     Other:  - neg other ROS          BP (!) 143/94 (BP Location: Right arm, Patient Position: Sitting, Cuff Size: Adult Regular)   Pulse 83   Temp 97.9  F (36.6  C) (Oral)   Resp 16   Ht 1.753 m (5' 9\")   Wt 90.4 kg (199 lb 6.4 oz)   SpO2 96%   BMI 29.45 kg/m      Physical Exam  Constitutional: Awake, alert, cooperative, no apparent distress, and appears stated age.  Eyes: consistent w/ PMH, extra ocular muscles appear intact, sclera clear, conjunctiva normal. Exam limited due to pt anxiety & visual impairment.  HENT: Normocephalic, oral pharynx with moist mucus membranes, good dentition. No goiter appreciated. No removable dental hardware. Missing 2 upper front teeth.  Respiratory: Clear to auscultation bilaterally, no crackles or wheezing. No SOB when supine.  Cardiovascular: Regular rate and rhythm, normal S1 and S2, and no murmur noted.  Carotids +2, no bruits. No edema. Palpable pulses to radial, DP and PT arteries.   GI: Normal bowel sounds, soft, non-distended, non-tender, no masses palpated.    Lymph/Hematologic: No cervical lymphadenopathy and no supraclavicular " lymphadenopathy.  Genitourinary:  deferred  Skin: Warm and dry.  No rashes.   Musculoskeletal: full ROM of neck. There is no redness, warmth, or swelling of the joints. Gross motor strength is normal.    Neurologic: Awake, alert, oriented to name, place and time. Cranial nerves II-XII are grossly intact. Gait is slow & cautious. Ambulates from chair to exam table feeling table for guidance, seats self, lies supine and sits back up w/o assistance.  Neuropsychiatric: Highly anxious, cooperative. Normal affect. Answers questions appropriately, follows commands w/o difficulty.         PRIOR LABS/DIAGNOSTIC STUDIES:    All labs and imaging personally reviewed      ICD INTERROGATION 6/24/22   Routine single chamber ICD remote transmission complete.  Battery longevity   estimates 3.1 years.  Stable lead performance with review of lead trending   graphs.  Patient continues to have SVT detections with activity.  Most  recent on 6/22/22 with Vrates 154-171bpm.  NO therapies noted.  Heart rate  histograms show good rate variation without tachy trend.  0% .  No short  V-V interval counts noted.  Optivol index reveals fluctuating fluid  accumulation based on RV lead thoracic impedance difference between the  daily and reference impedances. Plan: continue to follow with remotes every   three months and clinic every 5 years.  Indication for device:  Cardiomyopathy       EKG/ echocardiogram -  please see in ROS above       The patient's records and results personally reviewed by this provider.     Outside records reviewed from: Care Everywhere (provider notes & cardiac studies @ [facility])    LAB/DIAGNOSTIC STUDIES TODAY:  BMP, CBC, COVID    WBC Count 4.0 - 11.0 10e3/uL 6.2      RBC Count 4.40 - 5.90 10e6/uL 5.07     Hemoglobin 13.3 - 17.7 g/dL 14.9     Hematocrit 40.0 - 53.0 % 45.9     MCV 78 - 100 fL 91     MCH 26.5 - 33.0 pg 29.4     MCHC 31.5 - 36.5 g/dL 32.5     RDW 10.0 - 15.0 % 14.9     Platelet Count 150 - 450  10e3/uL 279     % Neutrophils % 51     % Lymphocytes % 33     % Monocytes % 12     % Eosinophils % 3     % Basophils % 1     % Immature Granulocytes % 0     NRBCs per 100 WBC <1 /100 0     Absolute Neutrophils 1.6 - 8.3 10e3/uL 3.2     Absolute Lymphocytes 0.8 - 5.3 10e3/uL 2.1     Absolute Monocytes 0.0 - 1.3 10e3/uL 0.7     Absolute Eosinophils 0.0 - 0.7 10e3/uL 0.2     Absolute Basophils 0.0 - 0.2 10e3/uL 0.0     Absolute Immature Granulocytes <=0.4 10e3/uL 0.0     Absolute NRBCs 10e3/uL 0.0      Sodium 136 - 145 mmol/L 137      Potassium 3.4 - 5.3 mmol/L 4.1     Chloride 98 - 107 mmol/L 109 High      Carbon Dioxide (CO2) 22 - 29 mmol/L 18 Low      Anion Gap 7 - 15 mmol/L 10     Urea Nitrogen 8.0 - 23.0 mg/dL 28.8 High      Creatinine 0.67 - 1.17 mg/dL 1.42 High      Calcium 8.8 - 10.2 mg/dL 9.4     Glucose 70 - 99 mg/dL 76     GFR Estimate >60 mL/min/1.73m2 56 Low           Assessment      Parvez Vásquez is a 62 year old male seen as a PAC referral for risk assessment and optimization for anesthesia.    Plan/Recommendations  Pt will be optimized for the proposed procedure.  See below for details on the assessment, risk, and preoperative recommendations    NEUROLOGY  - No history of TIA, CVA or seizure  - B/L glaucoma diagnosed 15 yrs ago. S/P cataract surgeries in the right eye; now has NLP vision in the right eye. Significant visual deficit.  -Post Op delirium risk factors:  No risk identified    ENT  - No current airway concerns.  Will need to be reassessed day of surgery.  Mallampati: III  TM: > 3    CARDIAC  - No history of CAD, Hypertension and Afib   - Followed by cardiology @ [facility]; last seen 5/2021. Medtronic ICD last interrogated 6/24/22.  - Hx NICM, likely due to prior ETOH.    - METS (Metabolic Equivalents)  Unable to walk 1-2 blocks due to pain on soles of feet & visual impairment. Denies CP. Uses stairs daily at home.    Patient CANNOT perform 4 METS exercise without symptoms             "Total Score: 1    Functional Capacity: Unable to complete 4 METS      RCRI-Low risk: Class 2 0.9% complication rate            Total Score: 1    RCRI: Elevated Creatinine        PULMONARY  - SHELLIE, Hasn't used CPAP in years, per pt    SHELLIE Medium Risk            Total Score: 3    SHELLIE: Snores loudly    SHELLIE: Over 50 ys old    SHELLIE: Male      - Denies asthma or inhaler use  - Tobacco History      History   Smoking Status     Never Smoker   Smokeless Tobacco     Never Used       GI  - Denies GERD  PONV Low Risk  Total Score: 1           1 AN PONV: Patient is not a current smoker        /RENAL  - Baseline Creatinine  1-2.0 (1.42 today)  - Urinary retention, Followed at [facility]. Recommended instructions for self cath, but pt declined due to discomfort with catheterization and partial blindness. Taking Flomax and finasteride. Retention likely due to BPH.     ENDOCRINE    - BMI: Estimated body mass index is 29.45 kg/m  as calculated from the following:    Height as of this encounter: 1.753 m (5' 9\").    Weight as of this encounter: 90.4 kg (199 lb 6.4 oz).  Overweight (BMI 25.0-29.9)  - No history of Diabetes Mellitus    HEME  VTE Low Risk 0.5%            Total Score: 3    VTE: Greater than 59 yrs old    VTE: Male      - No history of abnormal bleeding or antiplatelet use.    MSK  Patient is NOT Frail            Total Score: 0          PSYCH  - SIGNIFICANT anxiety. Pt will likely benefit from anxiolytic in preop.  - Needle phobia    MISC  - Fall risk due to neuropathy & visual impairment      The patient is optimized for their procedure. AVS with information on surgery time/arrival time, meds and NPO status given by nursing staff. No further diagnostic testing indicated.      On the day of service:     Prep time: 16 minutes  Visit time: 25 minutes  Documentation time: 21 minutes  ------------------------------------------  Total time: 62 minutes      Jayda Matute PA-C  Preoperative Assessment Center  [facility]  [facility]  Phone: [phone]  Fax: [phone]

## 2022-10-06 NOTE — TELEPHONE ENCOUNTER
Called and spoke with Mohini who said patient was wondering if he could lose vision from this procedure. Told her that yes that is a risk from this surgery. She understood and believes they will proceed surgery on Monday. She will discuss with the patient. States they are still working on arranging for a care taker at home and believe he may need admission after surgery as he lives on his own.

## 2022-10-06 NOTE — TELEPHONE ENCOUNTER
Keya and Parvez have questions about the procedure and would like to speak to someone from the medical team to understand more prior to surgery.

## 2022-10-07 ENCOUNTER — TRANSCRIBE ORDERS (OUTPATIENT)
Dept: OTHER | Age: 62
End: 2022-10-07

## 2022-10-07 DIAGNOSIS — Z86.79 HISTORY OF CLAUDICATION: ICD-10-CM

## 2022-10-07 DIAGNOSIS — I10 HYPERTENSION: ICD-10-CM

## 2022-10-07 DIAGNOSIS — H54.40 BLIND RIGHT EYE: Primary | ICD-10-CM

## 2022-10-07 DIAGNOSIS — H40.53X0 GLAUCOMA ASSOCIATED WITH OCULAR DISORDER, BILATERAL, STAGE UNSPECIFIED: ICD-10-CM

## 2022-10-09 ENCOUNTER — TELEPHONE (OUTPATIENT)
Dept: OPHTHALMOLOGY | Facility: CLINIC | Age: 62
End: 2022-10-09

## 2022-10-09 RX ORDER — ACETAMINOPHEN 325 MG/1
975 TABLET ORAL ONCE
Status: CANCELLED | OUTPATIENT
Start: 2022-10-09 | End: 2022-10-09

## 2022-10-10 NOTE — TELEPHONE ENCOUNTER
I called the patient to inform him that I have started feeling ill this evening myself and that I will likely not be able to make it in for his surgery tomorrow morning. It will need to be rescheduled. Patient expressed understanding.

## 2022-10-11 ENCOUNTER — TELEPHONE (OUTPATIENT)
Dept: OPHTHALMOLOGY | Facility: CLINIC | Age: 62
End: 2022-10-11

## 2022-10-11 NOTE — TELEPHONE ENCOUNTER
Called and spoke to Parvez Hannon surgery was canceled on 10/9 @ 10 pm by Dr. Jorge Hannon had a post op scheduled for this week 10/11 and 11/22    Those post-op appointments were canceled.     Spoke with Keya she will reach out to Shanda about rescheduling surgery ( about a week or so)    Eva Nava Communication Facilitator on 10/11/2022 at 10:20 AM

## 2022-10-11 NOTE — TELEPHONE ENCOUNTER
" [facility] Call Center    Phone Message    May a detailed message be left on voicemail: yes     Reason for Call: Other: Pt called because he was never contacted about his procedure that was supposed to be scheduled for 10/10. Writer sees the pre op appts and post op appts but no procedure was scheduled. Writer gave pt phone number for jimenez-op coordinator to get this taken care of.     Pt did not come to his post op appt for 10/11 due to no procedure being done and wants to make sure that he does have a \"no show\" appt on his charts.     Thank you     Action Taken: Message routed to:  [facility] ([facility] eye    Travel Screening: Not Applicable                                                                        "

## 2022-10-17 ENCOUNTER — TELEPHONE (OUTPATIENT)
Dept: OPHTHALMOLOGY | Facility: CLINIC | Age: 62
End: 2022-10-17

## 2022-10-17 NOTE — TELEPHONE ENCOUNTER
The patient reports left eye pain.  He notes that his regular glaucoma drops helped the eye pain.    I asked him to try PF artificial tears in between his other drops.  He does uses cool compresses and he may start warm compresses as well.  I scheduled him for tomorrow but he is unsure if he can find a rider.

## 2022-10-17 NOTE — TELEPHONE ENCOUNTER
[facility]    Phone Message    May a detailed message be left on voicemail: yes     Reason for Call: Symptoms or Concerns     If patient has red-flag symptoms, warm transfer to triage line    Current symptom or concern: Pt reports having Lt eye pain that started about an hour ago, has used the drops but is not helping. No other symptoms.    Symptoms have been present for: 1 hour(s)    Has patient previously been seen for this? Yes    By : Dr. Patel    Date: 8/15/22    Are there any new or worsening symptoms? Yes: New Lt eye pain.      Action Taken: Message routed to:  [facility] EYE    Travel Screening: Not Applicable

## 2022-10-17 NOTE — TELEPHONE ENCOUNTER
Parvez called to reschedule his postponed procedure and I explained I am still working on arranging time at [facility] for Dr. Carrillo Patel. I will be in touch as soon as I have an available date.

## 2022-10-18 ENCOUNTER — OFFICE VISIT (OUTPATIENT)
Dept: OPHTHALMOLOGY | Facility: CLINIC | Age: 62
End: 2022-10-18
Attending: OPHTHALMOLOGY
Payer: COMMERCIAL

## 2022-10-18 DIAGNOSIS — H04.123 BILATERAL DRY EYES: Primary | ICD-10-CM

## 2022-10-18 DIAGNOSIS — H40.1123 PRIMARY OPEN ANGLE GLAUCOMA (POAG) OF LEFT EYE, SEVERE STAGE: ICD-10-CM

## 2022-10-18 PROCEDURE — G0463 HOSPITAL OUTPT CLINIC VISIT: HCPCS

## 2022-10-18 PROCEDURE — 99213 OFFICE O/P EST LOW 20 MIN: CPT | Mod: GC | Performed by: OPHTHALMOLOGY

## 2022-10-18 ASSESSMENT — TONOMETRY
OD_IOP_MMHG: 3
OS_IOP_MMHG: 11
IOP_METHOD: ICARE

## 2022-10-18 ASSESSMENT — REFRACTION_WEARINGRX
OS_SPHERE: -3.00
OS_CYLINDER: +3.00
OS_AXIS: 165
SPECS_TYPE: BIFOCAL
OD_SPHERE: BALANCE
OS_ADD: +3.00

## 2022-10-18 ASSESSMENT — EXTERNAL EXAM - LEFT EYE: OS_EXAM: NORMAL

## 2022-10-18 ASSESSMENT — VISUAL ACUITY
METHOD: SNELLEN - LINEAR
OS_CC+: -1
OS_CC: 20/40
OD_CC: NLP

## 2022-10-18 ASSESSMENT — SLIT LAMP EXAM - LIDS
COMMENTS: NORMAL
COMMENTS: NORMAL

## 2022-10-18 ASSESSMENT — EXTERNAL EXAM - RIGHT EYE: OD_EXAM: NORMAL

## 2022-10-18 ASSESSMENT — CUP TO DISC RATIO: OS_RATIO: 0.85

## 2022-10-18 NOTE — NURSING NOTE
Chief Complaints and History of Present Illnesses   Patient presents with     Eye Pain Left Eye     Chief Complaint(s) and History of Present Illness(es)     Eye Pain Left Eye            Laterality: In left eye    Associated symptoms: headaches    Treatments tried: eye drops          Comments    Here for eye pain in left eye that started yesterday in which he describes as a 10/10. Today pain has improved. Notes some HA. Uses drops as instructed but not sure if supposed to take combigan and brimonidine both.     Justin Hardin COT 11:23 AM October 18, 2022

## 2022-10-18 NOTE — PROGRESS NOTES
Ophthalmology Acute Clinic     Chief Complaint(s) and History of Present Illness(es)     Eye Pain Left Eye      In left eye.  Associated symptoms include headaches.  Treatments tried include eye drops.           Comments    Here for eye pain in left eye that started yesterday in which he describes as a 10/10. Today pain has improved. Notes some HA. Uses drops as instructed but not sure if supposed to take combigan and brimonidine both.     Justin Hardin COT 11:23 AM October 18, 2022                      HPI:   Parvez Vásquez is a monocular 62 year old male with history of severe glaucoma in the left eye and NLP vision in the right eye (glaucoma and total RD) who presents to the acute clinic with one day of left eye pain around the left eye including left eyebrow and temple radiating to the left upper cheek. He took an advil and one drop of combigan and the pain resolved.     He is also interested in inquiring regarding re-scheduling his glaucoma surgery.     Past Ocular history:   Severe glaucoma, left eye   CE IOL right eye, s/p tube right eye       PMH: Obstructive sleep apnea, obesity, gout, hypercholesteremia, hypertension, non-ischemic cardiomyopathy (cardioverter)    FH: no history of glaucoma      Review of systems for the eyes was negative other than the pertinent positives/negatives listed in the HPI.      Imaging:   none    Assessment & Plan      Parvez Vásquez is a 62 year old male with the following diagnoses:   1. Bilateral dry eyes    2. Primary open angle glaucoma (POAG) of left eye, severe stage       Start artificial tears (provided rx for PF ATs today)  Continue present glaucoma regimen (combigan, latanoprost, diamox)  Surgery scheduler will call patient with updated surgery date for left [place] tube surgery      Patient disposition:   Return for surgery scheduler will phone patient to schedule left BGI.    Patient seen with Dr. Viki Gill MD  Resident Physician,  PGY-2  Department of Ophthalmology  10/18/22 12:38 PM    Katelyn Jamison MD  Ophthalmology Resident, PGY-4  [facility]     Attending Physician Attestation:  Complete documentation of historical and exam elements from today's encounter can be found in the full encounter summary report (not reduplicated in this progress note).  I personally obtained the chief complaint(s) and history of present illness.  I confirmed and edited as necessary the review of systems, past medical/surgical history, family history, social history, and examination findings as documented by others; and I examined the patient myself.  I personally reviewed the relevant tests, images, and reports as documented above.  I formulated and edited as necessary the assessment and plan and discussed the findings and management plan with the patient and family. . - Panchito rDew MD

## 2022-10-21 ENCOUNTER — TELEPHONE (OUTPATIENT)
Dept: OPHTHALMOLOGY | Facility: CLINIC | Age: 62
End: 2022-10-21

## 2022-10-21 DIAGNOSIS — H40.1133 PRIMARY OPEN ANGLE GLAUCOMA OF BOTH EYES, SEVERE STAGE: ICD-10-CM

## 2022-10-21 RX ORDER — BRIMONIDINE TARTRATE 2 MG/ML
1 SOLUTION/ DROPS OPHTHALMIC 3 TIMES DAILY
Qty: 10 ML | Refills: 5 | Status: SHIPPED | OUTPATIENT
Start: 2022-10-21 | End: 2023-02-02

## 2022-10-21 NOTE — TELEPHONE ENCOUNTER
"In reviewing the fax from pharmacy, it states the \"prescriber is not enrolled in Medicaid\", and this patient's insurance plan is a PMAP plan through the state. If prescriber is enrolled with Medicaid, they'd need to contact Medicaid to get that corrected. If not, would another prescriber be willing to re-write the prescription and send to pharmacy?      "

## 2022-11-03 ENCOUNTER — TRANSCRIBE ORDERS (OUTPATIENT)
Dept: OTHER | Age: 62
End: 2022-11-03

## 2022-11-03 DIAGNOSIS — H40.53X0 GLAUCOMA ASSOCIATED WITH OCULAR DISORDER, BILATERAL, STAGE UNSPECIFIED: ICD-10-CM

## 2022-11-03 DIAGNOSIS — H54.40 BLIND RIGHT EYE: Primary | ICD-10-CM

## 2022-11-03 DIAGNOSIS — I10 HYPERTENSION: ICD-10-CM

## 2022-11-03 DIAGNOSIS — Z86.79 HISTORY OF CLAUDICATION: ICD-10-CM

## 2022-11-23 DIAGNOSIS — H40.1133 PRIMARY OPEN ANGLE GLAUCOMA (POAG) OF BOTH EYES, SEVERE STAGE: ICD-10-CM

## 2022-12-08 ENCOUNTER — TELEPHONE (OUTPATIENT)
Dept: OPHTHALMOLOGY | Facility: CLINIC | Age: 62
End: 2022-12-08

## 2022-12-08 ENCOUNTER — OFFICE VISIT (OUTPATIENT)
Dept: INTERNAL MEDICINE | Facility: CLINIC | Age: 62
End: 2022-12-08
Payer: COMMERCIAL

## 2022-12-08 VITALS
HEART RATE: 60 BPM | OXYGEN SATURATION: 99 % | BODY MASS INDEX: 31.22 KG/M2 | SYSTOLIC BLOOD PRESSURE: 135 MMHG | DIASTOLIC BLOOD PRESSURE: 88 MMHG | WEIGHT: 211.4 LBS

## 2022-12-08 DIAGNOSIS — Z01.818 PREOP GENERAL PHYSICAL EXAM: Primary | ICD-10-CM

## 2022-12-08 PROCEDURE — 99214 OFFICE O/P EST MOD 30 MIN: CPT | Mod: GC | Performed by: STUDENT IN AN ORGANIZED HEALTH CARE EDUCATION/TRAINING PROGRAM

## 2022-12-08 NOTE — NURSING NOTE
Parvez Vásquez is a 62 year old male that presents in clinic today for the following:     Chief Complaint   Patient presents with     Pre-Op Exam     Pt here for pre-op exam       The patient's allergies and medications were reviewed. The patient's vitals were obtained without incident. The patient does not have any other questions for the provider.     Ok Rodriguez, EMT at 1:08 PM on 12/8/2022.  Primary Care Clinic: [phone]

## 2022-12-08 NOTE — PROGRESS NOTES
[facility] INTERNAL MEDICINE [address] [address]  Phone: [phone]  Fax: [phone]  Primary Provider: No Ref-Primary, Physician  Pre-op Performing Provider: LAI DALEY      PREOPERATIVE EVALUATION:  Today's date: 12/8/2022    Parvez Vásquez is a 62 year old male who presents for a preoperative evaluation.    Surgical Information:  Surgery/Procedure:  Left baerveldt implant   Surgery Date: 12/9    Type of Anesthesia Anticipated: Local    Subjective     HPI related to upcoming procedure:   62Y M with HTN, PAD, NICM, s/p ICD implant, HLD, SHELLIE, urinary retention, obesity, GERD, gout presents for preop prior to left eye Baerveldt implant.    Patient denies any exertional symptoms.  No shortness of breath, chest pain.  Not currently on blood thinners.     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  ABIs suggest bilat LE PAD  CTA LE bilat popliteal occlusions at knee level w/reconstitution       AVNRT (AV gissell re-entry tachycardia) (H) 09/30/2014     Priority: Medium     Formatting of this note might be different from the original.  3/2014 dual AVNRT pathway---RFA slow pathway  7/2011  ICD shock for rapid AF or Flutter  4/2009 ICD shock for rapid AF (was heavily drinking)       Non-ischemic cardiomyopathy (H) 08/21/2014     Priority: Medium     Formatting of this note might be different from the original.  Hx Non-ischemic cardiomyopathy w/normalized LVEF.       SHELLIE (obstructive sleep apnea) 02/27/2013  "    Priority: Medium     Formatting of this note might be different from the original.  Split-Night Polysomnogram Interpretation    Date of read:  2/27/2013   Estimated Body mass index is 32.49 kg/(m^2) as calculated from the following:    Height as of this encounter: 5' 9\"(1.753 m).    Weight as of this encounter: 220 lb(99.791 kg).  Hines Score: 12   Neck Circumference (in inches): 18     Baseline 2/24/2013:  This study was performed in order to evaluate for SHELLIE. He has excessive sleepiness, snoring, insufficeint sleep, and chronic insomnia.  Sleep medication: N/A (see comments)  Hypopnea Definition: Rule VIII.4.A  Due to the presence of respiratory events, this study was done in two parts (baseline, followed by titration).  The total sleep time was 196.9  minutes. The sleep latency was 20.6  minutes, which is normal. The REM sleep latency was 2.5  minutes which was reduced.  Sleep efficiency was 86.9  % which is normal.  The sleep architecture was fragmented with many sleep stage changes and arousals.  Sleep archictechture: Stage N1 34 %, Stage N2 49.5 % , Stage N3 0.8 %, Stage R 15.7 %, St; SHELLIE (obstructive sleep apnea)-severe (AHI 53)       Obesity 01/14/2013     Priority: Medium     Hypercholesteremia 01/27/2010     Priority: Medium     Hypertension 01/27/2010     Priority: Medium     Automatic implantable cardioverter-defibrillator in situ 05/05/2009     Priority: Medium     Formatting of this note might be different from the original.  3/2014 new ICD generator  Epic        Past Medical History:   Diagnosis Date     Gastroesophageal reflux disease without esophagitis      Gout      HLD (hyperlipidemia)      HTN (hypertension)      ICD (implantable cardioverter-defibrillator) in place      SHELLIE (obstructive sleep apnea)      PAD (peripheral artery disease) (H)      Primary open angle glaucoma (POAG) of both eyes      Urinary retention      Past Surgical History:   Procedure Laterality Date     EP INSERT ICD   " "    EYE SURGERY Right      Current Outpatient Medications   Medication Sig Dispense Refill     acetaZOLAMIDE (DIAMOX SEQUEL) 500 MG 12 hr capsule Take 1 capsule (500 mg) by mouth 2 times daily 60 capsule 1     brimonidine (ALPHAGAN) 0.2 % ophthalmic solution Place 1 drop into both eyes 3 times daily 10 mL 5     dextran 70-hypromellose (TEARS NATURALE FREE PF) 0.1-0.3 % ophthalmic solution Place 1 drop into both eyes 4 times daily as needed (dry eyes) 32 each 11     gabapentin (NEURONTIN) 100 MG capsule Take 100 mg by mouth 3 times daily       latanoprost (XALATAN) 0.005 % ophthalmic solution Place 1 drop into both eyes daily (Patient taking differently: Place 1 drop into both eyes every evening) 2.5 mL 3     tamsulosin (FLOMAX) 0.4 MG capsule Take 0.8 mg by mouth every morning       timolol maleate (TIMOPTIC) 0.5 % ophthalmic solution Place 1 drop into both eyes 2 times daily 5 mL 3       Allergies   Allergen Reactions     Fentanyl Anaphylaxis     Midazolam Anaphylaxis     Cilostazol Other (See Comments)     Says made him feel \"high\"        Social History     Tobacco Use     Smoking status: Never     Smokeless tobacco: Never   Substance Use Topics     Alcohol use: Not Currently       History   Drug Use Unknown         Objective     There were no vitals taken for this visit.    Physical Exam    Gen: NAD  CV: RRR, Ns1,S2. No JVD   Pulm: Clear B/l. No wheezing   Abd: Soft. Non-distended. Non-tender to palpation   Ext:  No Periferal edema   Neuro: Non-focal.       Recent Labs   Lab Test 10/06/22  1040   HGB 14.9         POTASSIUM 4.1   CR 1.42*        Diagnostics:  No labs were ordered during this visit.   No EKG required for low risk surgery (cataract, skin procedure, breast biopsy, etc).    Revised Cardiac Risk Index (RCRI):  The patient has the following serious cardiovascular risks for perioperative complications:   - No serious cardiac risks = 0 points     RCRI Interpretation: 0 points: Class I (very " low risk - 0.4% complication rate)    Patient is at an acceptable risk for the procedure.  No further work-up required.      Stephan Xie MD  Internal Medicine Resident (PGY3)  [phone]

## 2022-12-08 NOTE — TELEPHONE ENCOUNTER
With a cancellation for tomorrow at [facility] I reached out to reschedule Parvez for surgery with Dr. Carrillo Patel. Spoke with Keya to make sure she could bring Parvez, Keya said she could and she would call Parvez to make sure he was available and would call me back at [phone].    Keya called back and said Parvez want to speak with me. I called Parvez and he need to vent his frustration over this case taking so long to reschedule. Parvez eventually said he wanted to move forward.     I called Keya and scheduled pre-op and post-ops and the surgery for tomorrow.    Patient is scheduled for surgery with Dr. Carrillo Patel     Spoke with: Shanda     Date of Surgery: 12/09     Location: [facility]:  [address] [place] [id number]     H&P will be completed at: [facility] 12/08     Post Op scheduled on 12/10, 12/15, and 01/10     Surgery packet was given for previous scheduled surgery     Additional comments: Advised RN will call 1 - 3 business days prior with arrival time and instructions. Informed patient they will need an adult  and responsible adult to stay with for 24 hours following surgery

## 2022-12-09 ENCOUNTER — HOSPITAL ENCOUNTER (OUTPATIENT)
Facility: CLINIC | Age: 62
Discharge: HOME | End: 2022-12-09
Attending: OPHTHALMOLOGY | Admitting: OPHTHALMOLOGY
Payer: COMMERCIAL

## 2022-12-09 ENCOUNTER — ANESTHESIA EVENT (OUTPATIENT)
Dept: SURGERY | Facility: CLINIC | Age: 62
End: 2022-12-09
Payer: COMMERCIAL

## 2022-12-09 ENCOUNTER — ANESTHESIA (OUTPATIENT)
Dept: SURGERY | Facility: CLINIC | Age: 62
End: 2022-12-09
Payer: COMMERCIAL

## 2022-12-09 VITALS
BODY MASS INDEX: 30.7 KG/M2 | OXYGEN SATURATION: 97 % | DIASTOLIC BLOOD PRESSURE: 83 MMHG | TEMPERATURE: 97.1 F | HEART RATE: 72 BPM | RESPIRATION RATE: 20 BRPM | SYSTOLIC BLOOD PRESSURE: 117 MMHG | WEIGHT: 207.89 LBS

## 2022-12-09 DIAGNOSIS — Z98.890 POSTOPERATIVE EYE STATE: ICD-10-CM

## 2022-12-09 DIAGNOSIS — H40.1133 PRIMARY OPEN ANGLE GLAUCOMA (POAG) OF BOTH EYES, SEVERE STAGE: Primary | ICD-10-CM

## 2022-12-09 LAB
ANION GAP SERPL CALCULATED.3IONS-SCNC: 5 MMOL/L (ref 3–14)
BUN SERPL-MCNC: 25 MG/DL (ref 7–30)
CALCIUM SERPL-MCNC: 8.8 MG/DL (ref 8.5–10.1)
CHLORIDE BLD-SCNC: 117 MMOL/L (ref 94–109)
CO2 SERPL-SCNC: 17 MMOL/L (ref 20–32)
CREAT SERPL-MCNC: 1.17 MG/DL (ref 0.66–1.25)
ERYTHROCYTE [DISTWIDTH] IN BLOOD BY AUTOMATED COUNT: 15 % (ref 10–15)
GFR SERPL CREATININE-BSD FRML MDRD: 70 ML/MIN/1.73M2
GLUCOSE BLD-MCNC: 76 MG/DL (ref 70–99)
HCT VFR BLD AUTO: 42 % (ref 40–53)
HGB BLD-MCNC: 13.4 G/DL (ref 13.3–17.7)
MCH RBC QN AUTO: 30.2 PG (ref 26.5–33)
MCHC RBC AUTO-ENTMCNC: 31.9 G/DL (ref 31.5–36.5)
MCV RBC AUTO: 95 FL (ref 78–100)
PLATELET # BLD AUTO: 193 10E3/UL (ref 150–450)
POTASSIUM BLD-SCNC: 4 MMOL/L (ref 3.4–5.3)
RBC # BLD AUTO: 4.43 10E6/UL (ref 4.4–5.9)
SODIUM SERPL-SCNC: 139 MMOL/L (ref 133–144)
WBC # BLD AUTO: 4.7 10E3/UL (ref 4–11)

## 2022-12-09 PROCEDURE — 250N000011 HC RX IP 250 OP 636: Performed by: NURSE ANESTHETIST, CERTIFIED REGISTERED

## 2022-12-09 PROCEDURE — 370N000017 HC ANESTHESIA TECHNICAL FEE, PER MIN: Performed by: OPHTHALMOLOGY

## 2022-12-09 PROCEDURE — 999N000141 HC STATISTIC PRE-PROCEDURE NURSING ASSESSMENT: Performed by: OPHTHALMOLOGY

## 2022-12-09 PROCEDURE — 250N000025 HC SEVOFLURANE, PER MIN: Performed by: OPHTHALMOLOGY

## 2022-12-09 PROCEDURE — C1762 CONN TISS, HUMAN(INC FASCIA): HCPCS | Performed by: OPHTHALMOLOGY

## 2022-12-09 PROCEDURE — 93010 ELECTROCARDIOGRAM REPORT: CPT | Mod: 59 | Performed by: INTERNAL MEDICINE

## 2022-12-09 PROCEDURE — C1783 OCULAR IMP, AQUEOUS DRAIN DE: HCPCS | Performed by: OPHTHALMOLOGY

## 2022-12-09 PROCEDURE — 272N000001 HC OR GENERAL SUPPLY STERILE: Performed by: OPHTHALMOLOGY

## 2022-12-09 PROCEDURE — 710N000010 HC RECOVERY PHASE 1, LEVEL 2, PER MIN: Performed by: OPHTHALMOLOGY

## 2022-12-09 PROCEDURE — 258N000003 HC RX IP 258 OP 636: Performed by: NURSE ANESTHETIST, CERTIFIED REGISTERED

## 2022-12-09 PROCEDURE — 250N000009 HC RX 250: Performed by: NURSE ANESTHETIST, CERTIFIED REGISTERED

## 2022-12-09 PROCEDURE — 360N000077 HC SURGERY LEVEL 4, PER MIN: Performed by: OPHTHALMOLOGY

## 2022-12-09 PROCEDURE — 85014 HEMATOCRIT: CPT | Performed by: ANESTHESIOLOGY

## 2022-12-09 PROCEDURE — 250N000011 HC RX IP 250 OP 636: Performed by: OPHTHALMOLOGY

## 2022-12-09 PROCEDURE — 66180 AQUEOUS SHUNT EYE W/GRAFT: CPT | Mod: LT | Performed by: OPHTHALMOLOGY

## 2022-12-09 PROCEDURE — 999N000054 HC STATISTIC EKG NON-CHARGEABLE

## 2022-12-09 PROCEDURE — 710N000012 HC RECOVERY PHASE 2, PER MINUTE: Performed by: OPHTHALMOLOGY

## 2022-12-09 PROCEDURE — 250N000009 HC RX 250: Performed by: OPHTHALMOLOGY

## 2022-12-09 PROCEDURE — V2785 CORNEAL TISSUE PROCESSING: HCPCS | Performed by: OPHTHALMOLOGY

## 2022-12-09 PROCEDURE — 80048 BASIC METABOLIC PNL TOTAL CA: CPT | Performed by: ANESTHESIOLOGY

## 2022-12-09 PROCEDURE — 250N000013 HC RX MED GY IP 250 OP 250 PS 637: Performed by: STUDENT IN AN ORGANIZED HEALTH CARE EDUCATION/TRAINING PROGRAM

## 2022-12-09 DEVICE — GLAUCOMA IMPLANT BG101-350 GLOBAL
Type: IMPLANTABLE DEVICE | Site: EYE | Status: FUNCTIONAL
Brand: BAERVELDT GLAUCOMA IMPLANT (350)

## 2022-12-09 DEVICE — EYE IMP OPTIGRAFT CORNEA 1/2 HALO HCO-HH1: Type: IMPLANTABLE DEVICE | Site: EYE | Status: FUNCTIONAL

## 2022-12-09 RX ORDER — ASPIRIN 81 MG/1
81 TABLET, CHEWABLE ORAL DAILY
COMMUNITY

## 2022-12-09 RX ORDER — DEXAMETHASONE SODIUM PHOSPHATE 4 MG/ML
INJECTION, SOLUTION INTRA-ARTICULAR; INTRALESIONAL; INTRAMUSCULAR; INTRAVENOUS; SOFT TISSUE PRN
Status: DISCONTINUED | OUTPATIENT
Start: 2022-12-09 | End: 2022-12-09

## 2022-12-09 RX ORDER — ONDANSETRON 2 MG/ML
4 INJECTION INTRAMUSCULAR; INTRAVENOUS EVERY 30 MIN PRN
Status: DISCONTINUED | OUTPATIENT
Start: 2022-12-09 | End: 2022-12-09 | Stop reason: HOSPADM

## 2022-12-09 RX ORDER — HYDROMORPHONE HCL IN WATER/PF 6 MG/30 ML
0.2 PATIENT CONTROLLED ANALGESIA SYRINGE INTRAVENOUS EVERY 5 MIN PRN
Status: DISCONTINUED | OUTPATIENT
Start: 2022-12-09 | End: 2022-12-09 | Stop reason: HOSPADM

## 2022-12-09 RX ORDER — BALANCED SALT SOLUTION 6.4; .75; .48; .3; 3.9; 1.7 MG/ML; MG/ML; MG/ML; MG/ML; MG/ML; MG/ML
SOLUTION OPHTHALMIC PRN
Status: DISCONTINUED | OUTPATIENT
Start: 2022-12-09 | End: 2022-12-09 | Stop reason: HOSPADM

## 2022-12-09 RX ORDER — SODIUM CHLORIDE, SODIUM LACTATE, POTASSIUM CHLORIDE, CALCIUM CHLORIDE 600; 310; 30; 20 MG/100ML; MG/100ML; MG/100ML; MG/100ML
INJECTION, SOLUTION INTRAVENOUS CONTINUOUS
Status: DISCONTINUED | OUTPATIENT
Start: 2022-12-09 | End: 2022-12-09 | Stop reason: HOSPADM

## 2022-12-09 RX ORDER — DEXMEDETOMIDINE HYDROCHLORIDE 4 UG/ML
INJECTION, SOLUTION INTRAVENOUS PRN
Status: DISCONTINUED | OUTPATIENT
Start: 2022-12-09 | End: 2022-12-09

## 2022-12-09 RX ORDER — ONDANSETRON 4 MG/1
4 TABLET, ORALLY DISINTEGRATING ORAL EVERY 30 MIN PRN
Status: DISCONTINUED | OUTPATIENT
Start: 2022-12-09 | End: 2022-12-09 | Stop reason: HOSPADM

## 2022-12-09 RX ORDER — PROPOFOL 10 MG/ML
INJECTION, EMULSION INTRAVENOUS PRN
Status: DISCONTINUED | OUTPATIENT
Start: 2022-12-09 | End: 2022-12-09

## 2022-12-09 RX ORDER — LIDOCAINE HYDROCHLORIDE 20 MG/ML
INJECTION, SOLUTION INFILTRATION; PERINEURAL PRN
Status: DISCONTINUED | OUTPATIENT
Start: 2022-12-09 | End: 2022-12-09

## 2022-12-09 RX ORDER — HYDROMORPHONE HCL IN WATER/PF 6 MG/30 ML
0.4 PATIENT CONTROLLED ANALGESIA SYRINGE INTRAVENOUS EVERY 5 MIN PRN
Status: DISCONTINUED | OUTPATIENT
Start: 2022-12-09 | End: 2022-12-09 | Stop reason: HOSPADM

## 2022-12-09 RX ORDER — MOXIFLOXACIN 5 MG/ML
1 SOLUTION/ DROPS OPHTHALMIC 4 TIMES DAILY
Qty: 3 ML | Refills: 0 | Status: SHIPPED | OUTPATIENT
Start: 2022-12-10 | End: 2023-01-10

## 2022-12-09 RX ORDER — NEOMYCIN SULFATE, POLYMYXIN B SULFATE, AND DEXAMETHASONE 3.5; 10000; 1 MG/G; [USP'U]/G; MG/G
OINTMENT OPHTHALMIC PRN
Status: DISCONTINUED | OUTPATIENT
Start: 2022-12-09 | End: 2022-12-09 | Stop reason: HOSPADM

## 2022-12-09 RX ORDER — PREDNISOLONE ACETATE 10 MG/ML
1 SUSPENSION/ DROPS OPHTHALMIC 4 TIMES DAILY
Qty: 5 ML | Refills: 0 | Status: SHIPPED | OUTPATIENT
Start: 2022-12-10 | End: 2023-01-10

## 2022-12-09 RX ORDER — SODIUM CHLORIDE, SODIUM LACTATE, POTASSIUM CHLORIDE, CALCIUM CHLORIDE 600; 310; 30; 20 MG/100ML; MG/100ML; MG/100ML; MG/100ML
INJECTION, SOLUTION INTRAVENOUS CONTINUOUS PRN
Status: DISCONTINUED | OUTPATIENT
Start: 2022-12-09 | End: 2022-12-09

## 2022-12-09 RX ORDER — ACETAMINOPHEN 325 MG/1
975 TABLET ORAL ONCE
Status: COMPLETED | OUTPATIENT
Start: 2022-12-09 | End: 2022-12-09

## 2022-12-09 RX ORDER — ONDANSETRON 2 MG/ML
INJECTION INTRAMUSCULAR; INTRAVENOUS PRN
Status: DISCONTINUED | OUTPATIENT
Start: 2022-12-09 | End: 2022-12-09

## 2022-12-09 RX ADMIN — PHENYLEPHRINE HYDROCHLORIDE 100 MCG: 10 INJECTION INTRAVENOUS at 18:24

## 2022-12-09 RX ADMIN — DEXMEDETOMIDINE 20 MCG: 100 INJECTION, SOLUTION, CONCENTRATE INTRAVENOUS at 17:41

## 2022-12-09 RX ADMIN — ACETAMINOPHEN 975 MG: 325 TABLET, FILM COATED ORAL at 20:26

## 2022-12-09 RX ADMIN — LIDOCAINE HYDROCHLORIDE 100 MG: 20 INJECTION, SOLUTION INFILTRATION; PERINEURAL at 17:45

## 2022-12-09 RX ADMIN — Medication 50 MG: at 17:48

## 2022-12-09 RX ADMIN — PHENYLEPHRINE HYDROCHLORIDE 100 MCG: 10 INJECTION INTRAVENOUS at 18:36

## 2022-12-09 RX ADMIN — DEXAMETHASONE SODIUM PHOSPHATE 8 MG: 4 INJECTION, SOLUTION INTRA-ARTICULAR; INTRALESIONAL; INTRAMUSCULAR; INTRAVENOUS; SOFT TISSUE at 18:03

## 2022-12-09 RX ADMIN — PHENYLEPHRINE HYDROCHLORIDE 100 MCG: 10 INJECTION INTRAVENOUS at 18:44

## 2022-12-09 RX ADMIN — PROPOFOL 200 MG: 10 INJECTION, EMULSION INTRAVENOUS at 17:47

## 2022-12-09 RX ADMIN — ONDANSETRON 4 MG: 2 INJECTION INTRAMUSCULAR; INTRAVENOUS at 18:59

## 2022-12-09 RX ADMIN — DEXMEDETOMIDINE 20 MCG: 100 INJECTION, SOLUTION, CONCENTRATE INTRAVENOUS at 17:44

## 2022-12-09 RX ADMIN — SUGAMMADEX 200 MG: 100 INJECTION, SOLUTION INTRAVENOUS at 19:11

## 2022-12-09 RX ADMIN — PHENYLEPHRINE HYDROCHLORIDE 0.5 MCG/KG/MIN: 10 INJECTION INTRAVENOUS at 18:48

## 2022-12-09 RX ADMIN — SODIUM CHLORIDE, POTASSIUM CHLORIDE, SODIUM LACTATE AND CALCIUM CHLORIDE: 600; 310; 30; 20 INJECTION, SOLUTION INTRAVENOUS at 17:38

## 2022-12-09 RX ADMIN — PHENYLEPHRINE HYDROCHLORIDE 100 MCG: 10 INJECTION INTRAVENOUS at 17:47

## 2022-12-09 ASSESSMENT — ACTIVITIES OF DAILY LIVING (ADL)
ADLS_ACUITY_SCORE: 35

## 2022-12-09 NOTE — ANESTHESIA PREPROCEDURE EVALUATION
"Anesthesia Pre-Procedure Evaluation    Patient: Parvez Vásquez   MRN: [id number] : 1960        Procedure : Procedure(s):  LEFT INSERTION, BAERVELDT  IMPLANT, EYE          Past Medical History:   Diagnosis Date     Gastroesophageal reflux disease without esophagitis      Gout      HLD (hyperlipidemia)      HTN (hypertension)      ICD (implantable cardioverter-defibrillator) in place      SHELLIE (obstructive sleep apnea)      PAD (peripheral artery disease) (H)      Primary open angle glaucoma (POAG) of both eyes      Urinary retention       Past Surgical History:   Procedure Laterality Date     EP INSERT ICD       EYE SURGERY Right       Allergies   Allergen Reactions     Fentanyl Anaphylaxis     Midazolam Anaphylaxis     Cilostazol Other (See Comments)     Says made him feel \"high\"      Social History     Tobacco Use     Smoking status: Never     Smokeless tobacco: Never   Substance Use Topics     Alcohol use: Not Currently      Wt Readings from Last 1 Encounters:   22 94.3 kg (207 lb 14.3 oz)        Anesthesia Evaluation            ROS/MED HX  ENT/Pulmonary:     (+) sleep apnea,     Neurologic:       Cardiovascular:     (+) Dyslipidemia hypertension-Peripheral Vascular Disease-CAD ---ICD     METS/Exercise Tolerance:     Hematologic:       Musculoskeletal:       GI/Hepatic:     (+) GERD,     Renal/Genitourinary:       Endo:     (+) Obesity,     Psychiatric/Substance Use:       Infectious Disease:       Malignancy:       Other:            Physical Exam    Airway        Mallampati: III   TM distance: > 3 FB   Neck ROM: full   Mouth opening: > 3 cm    Respiratory Devices and Support         Dental         B=Bridge, C=Chipped, L=Loose, M=Missing    Cardiovascular   cardiovascular exam normal       Rhythm and rate: regular     Pulmonary   pulmonary exam normal                OUTSIDE LABS:  CBC:   Lab Results   Component Value Date    WBC 6.2 10/06/2022    HGB 14.9 10/06/2022    HCT 45.9 10/06/2022    PLT " 279 10/06/2022     BMP:   Lab Results   Component Value Date     10/06/2022    POTASSIUM 4.1 10/06/2022    CHLORIDE 109 (H) 10/06/2022    CO2 18 (L) 10/06/2022    BUN 28.8 (H) 10/06/2022    CR 1.42 (H) 10/06/2022    GLC 76 10/06/2022     COAGS: No results found for: PTT, INR, FIBR  POC: No results found for: BGM, HCG, HCGS  HEPATIC: No results found for: ALBUMIN, PROTTOTAL, ALT, AST, GGT, ALKPHOS, BILITOTAL, BILIDIRECT, DIMPLE  OTHER:   Lab Results   Component Value Date    MCKAYLA 9.4 10/06/2022       Anesthesia Plan    ASA Status:  3      Anesthesia Type: General.     - Airway: ETT   Induction: Intravenous.   Maintenance: Balanced.   Techniques and Equipment:     - Airway: Video-Laryngoscope         Consents    Anesthesia Plan(s) and associated risks, benefits, and realistic alternatives discussed. Questions answered and patient/representative(s) expressed understanding.    - Discussed:     - Discussed with:  Patient      - Extended Intubation/Ventilatory Support Discussed: No.      - Patient is DNR/DNI Status: No    Use of blood products discussed: No .     Postoperative Care       PONV prophylaxis: Ondansetron (or other 5HT-3), Dexamethasone or Solumedrol     Comments:    Other Comments: AICD interrogated within last six months. CBC, ECG, BMP ordered. Issues: HTN, Hyperlipidemia, Non ischemic cardiomyopathy, SHELLIE, PVD with claudication, Glaucoma Total blindness R. Eye.            David Ling DO

## 2022-12-09 NOTE — DISCHARGE INSTRUCTIONS
Discharge Instructions Following Cataract Surgery    Date: 12/9/2022    Leave the eye patch and shield in place until your post-operative appointment tomorrow morning. Do not take any eyedrops in your operative eye until tomorrow morning. Do not take any diamox pills for now. Continue your usual drops for your other eye.     You have a post-operative appointment at the [facility] at [address] [place] [address], at 8:15 AM tomorrow morning with Dr. Stas Taylor.     For 5 days: Wear your glasses or leave the eye uncovered while awake.    Wear the eye shield every night and during daytime naps.  DO NOT use padding under eye shield.    If you experience any severe pain, sudden decrease in vision, or discharge for the eye, contact your doctor immediately.     Minor itching, scratching, burning etc. is normal. Use regular or extra-strength Tylenol for discomfort.    Refrain from heavy lifting, excessive bending over, and heavy exertion .    You may bathe or shower but do not get the eye wet.    Do not rub or push on the operative eye for 1 week.  You may wipe the lids gently with a warm wet cloth to remove matter from eyelashes.    Continue with your usual diet and medications prescribed by your doctor.    Sunglasses will increase your comfort post-operatively.    Post Operative Visit on 12/10/22 at [facility] [address] [place] [address].  Bring all material and medications to the office on the first post-op day.  Call your surgeon at [phone] or the answering service for any problems, especially pain.      Same-Day Surgery   Adult Discharge Orders & Instructions     For 24 hours after surgery:  Get plenty of rest.  A responsible adult must stay with you for at least 24 hours after you leave the hospital.   Pain medication can slow your reflexes. Do not drive or use heavy equipment.  If you have weakness or tingling, don't  drive or use heavy equipment until this feeling goes away.  Mixing alcohol and pain medication can cause dizziness and slow your breathing. It can even be fatal. Do not drink alcohol while taking pain medication.  Avoid strenuous or risky activities.  Ask for help when climbing stairs.   You may feel lightheaded.  If so, sit for a few minutes before standing.  Have someone help you get up.   If you have nausea (feel sick to your stomach), drink only clear liquids such as apple juice, ginger ale, broth or 7-Up.  Rest may also help.  Be sure to drink enough fluids.  Move to a regular diet as you feel able. Take pain medications with a small amount of solid food, such as toast or crackers, to avoid nausea.   A slight fever is normal. Call the doctor if your fever is over 100 F (37.7 C) (taken under the tongue) or lasts longer than 24 hours.  You may have a dry mouth, muscle aches, trouble sleeping or a sore throat.  These symptoms should go away after 24 hours.  Do not make important or legal decisions.   Pain Management:      1. Take pain medication (if prescribed) for pain as directed by your physician.        2. WARNING: If the pain medication you have been prescribed contains Tylenol  (acetaminophen), DO NOT take additional doses of Tylenol (acetaminophen).     Call your doctor for any of the followin.  Signs of infection (fever, growing tenderness at the surgery site, severe pain, a large amount of drainage or bleeding, foul-smelling drainage, redness, swelling).    2.  It has been over 8 to 10 hours since surgery and you are still not able to urinate (pee).    3.  Headache for over 24 hours.    4.  Numbness, tingling or weakness the day after surgery (if you had spinal anesthesia).  To contact a doctor, call Dr. Patel, [facility], [phone]  or:  ' [phone] and ask for the Resident On Call for:          Opthalmology (answered 24 hours a day)  '   Emergency Department:  [place]  Emergency Department: [phone]  [place] Emergency Department: [phone]               Rev. 10/2014

## 2022-12-10 ENCOUNTER — OFFICE VISIT (OUTPATIENT)
Dept: OPHTHALMOLOGY | Facility: CLINIC | Age: 62
End: 2022-12-10
Payer: COMMERCIAL

## 2022-12-10 ENCOUNTER — TELEPHONE (OUTPATIENT)
Dept: OPHTHALMOLOGY | Facility: CLINIC | Age: 62
End: 2022-12-10

## 2022-12-10 DIAGNOSIS — H40.1133 PRIMARY OPEN ANGLE GLAUCOMA (POAG) OF BOTH EYES, SEVERE STAGE: ICD-10-CM

## 2022-12-10 DIAGNOSIS — H40.1133 PRIMARY OPEN ANGLE GLAUCOMA (POAG) OF BOTH EYES, SEVERE STAGE: Primary | ICD-10-CM

## 2022-12-10 DIAGNOSIS — Z98.890 POST-OPERATIVE STATE: ICD-10-CM

## 2022-12-10 PROCEDURE — 99024 POSTOP FOLLOW-UP VISIT: CPT | Mod: GC | Performed by: STUDENT IN AN ORGANIZED HEALTH CARE EDUCATION/TRAINING PROGRAM

## 2022-12-10 RX ORDER — ACETAZOLAMIDE 250 MG/1
250 TABLET ORAL 2 TIMES DAILY
Qty: 30 TABLET | Refills: 3 | Status: SHIPPED | OUTPATIENT
Start: 2022-12-10 | End: 2023-04-11

## 2022-12-10 ASSESSMENT — TONOMETRY
OD_IOP_MMHG: 5
OD_IOP_MMHG: 7
OS_IOP_MMHG: 34
IOP_METHOD: APPLANATION
OS_IOP_MMHG: 39
IOP_METHOD: ICARE

## 2022-12-10 ASSESSMENT — EXTERNAL EXAM - LEFT EYE: OS_EXAM: NORMAL

## 2022-12-10 ASSESSMENT — REFRACTION_WEARINGRX
OD_SPHERE: BALANCE
OS_SPHERE: -3.00
OS_CYLINDER: +3.00
SPECS_TYPE: BIFOCAL
OS_AXIS: 165
OS_ADD: +3.00

## 2022-12-10 ASSESSMENT — VISUAL ACUITY
METHOD: SNELLEN - LINEAR
OD_CC: NLP
OS_CC: 20/100

## 2022-12-10 ASSESSMENT — SLIT LAMP EXAM - LIDS
COMMENTS: NORMAL
COMMENTS: NORMAL

## 2022-12-10 ASSESSMENT — EXTERNAL EXAM - RIGHT EYE: OD_EXAM: NORMAL

## 2022-12-10 ASSESSMENT — CUP TO DISC RATIO: OS_RATIO: 0.85

## 2022-12-10 NOTE — PROGRESS NOTES
Chief Complaint/Presenting Concern: severe POAG each eye, referred by Dr. Kurtz, s/p Left BGI 12/9/22.     History of Present Illness:   Parvez Vásquez is a 61 year old patient who presents for evaluation of glaucoma.  Referred by Dr. Kurtz for evaluation of severe glaucoma in both eyes. Patient reports that he was diagnosed with glaucoma in both eyes about 15 years ago. Patient has a complicated history of glaucoma and cataract surgeries in the right eye and now has NLP vision in the right eye. left eye has been progressing over the years but patient is hesitant to have surgery in the left eye due to poor outcome after surgery in the right eye.     12/10/22: POD1 s/p left BGI 12/9/22, doing well overall with tolerable pain. He kept eye patch on last night. He has not taken any diamox since the day before surgery. He has not taken any left eye drops since surgery.       Relevant Past Medical/Family/Social History:  PMH: Non ischemic cardiopmyopathy, htn, AVNRT s/p debrillator  FH: non contributory     Relevant Review of Systems: none     Diagnosis: Primary open angle glaucoma  severe left eye   Year diagnosis: 2007  Previous glaucoma surgery/laser:  - Ahmed right eye (2011, [facility])  - CEIOL, lysis of iridocorneal adhesions, IFIS, - trabeculectomy/[facility] right eye (2012, [facility])  Maximum intraocular pressure: unknown/32 mmHg   Family history: negative  CCT: 55/3 /523  Gonio: open to scleral spur 360;   Refractive status: low myope  Trauma history: negative  Steroid exposure: positive  Vasospastic disease: Migrane/Raynaud phenomenon: negative  A past hemodynamic crisis or Low BP: negative  Meds AEs/intolerance: unknown   PMHx: cardiomyopathy   Anticoagulants: none    - Visual field August 11, 2022  - Right eye - unable  - Left eye - generalized depression high FP/FN  - OCT Macula August 11, 2022  - Right Eye: unable  - Left Eye: no subretinal or intraretinal fluid, complete PVD  - OCT Optic Nerve RNFL Spectralis  August 11, 2022  - Right Eye: unable  - Left Eye: Diffuse RNFL thinning, floor effect      Today's testing:  - IOP 7/39 mmHg     Additional Ocular History:     2. Cataract, left eye    3. PCIOL right eye, sulcus lens  - surgery complicated by IFIS and iris prolapse    4. NLP right eye   Total RD     Plan/Recommendations:    Discussed findings with patient.    Patient with advanced glaucoma left eye with uncontrolled IOP requiring oral Diamox with evidence of progression over time, now status post left BGI surgery 12/9/22. Avoided cataract surgery at time of glaucoma surgery given history of complication in the right eye, plan for cataract surgery after IOP is well controlled.    POD1 s/p left BGI 12/9/22 doing well overall with left IOP of 39 off diamox.     Left eye post-op medications:  o Restart latanoprost in left eye.   o Restart timolol in left eye.   o Restart brimonidine in left eye.   o Start prednisolone eyedrop 4 times per day in left eye.   o Start vigamox eyedrop 4 times per day in left eye.   o Restart Diamox at lower dose of 250 mg BID.     Continue current medications in right eye:   o Latanoprost at bedtime left eye.   o Timolol BID left eye.  o Brimonidine TID left eye.    Post-op return precautions reviewed.    Follow-up in glaucoma clinic in 5 days on 12/15/22 at 8:45 AM at [facility] as currently scheduled.       Patient seen with Dr. Stas Taylor. Discussed with Dr. Patel.      Eliezer Kenyon MD  Ophthalmology, PGY-3    ATTESTATION     Attending Physician Attestation:      Complete documentation of historical and exam elements from today's encounter can be found in the full encounter summary report (not reduplicated in this progress note).  I personally obtained the chief complaint(s) and history of present illness.  I confirmed and edited as necessary the review of systems, past medical/surgical history, family history, social history, and examination findings as  documented by others; and I examined the patient myself.  I personally reviewed the relevant tests, images, and reports as documented above.  I formulated and edited as necessary the assessment and plan and discussed the findings and management plan with the patient and family    Stas Taylor MD  Retina Fellow  Department of Ophthalmology  [facility]  Pager: [phone]

## 2022-12-10 NOTE — PATIENT INSTRUCTIONS
Eyedrops:  Latanoprost (teal top) at bedtime both eyes.   Timolol (yellow top) 2 times per day in both eyes.    Brimonidine (purple top) 3 times per day in both eyes.   Start the new prednisolone (white top) eyedrop 4 times per day in LEFT eye.   Start the new vigamox (tan top) eyedrop 4 times per day in the LEFT eye.   Start diamox pills 250 mg twice per day (one 250 mg pill in the morning, one 250 mg pill in the evening). These are smaller pills than the ones you were taking previously.     Follow-up in glaucoma clinic in 5 days on 12/15/22 at 8:45 AM at [facility] as currently scheduled.   Call if you have increased pain, loss of vision, or other issues.

## 2022-12-10 NOTE — BRIEF OP NOTE
[facility]    Brief Operative Note    Pre-operative diagnosis: Primary open angle glaucoma (POAG) of left eye, severe stage [H40.1123]  Post-operative diagnosis Same as pre-operative diagnosis    Procedure: Procedure(s):  LEFT INSERTION, BAERVELDT  IMPLANT, EYE  Surgeon: Surgeon(s) and Role:     * Carrillo Patel MD - Primary   Assistant: Eliezer Kenyon MD  Anesthesia: General   Estimated Blood Loss: Minimal    Drains: None  Specimens: * No specimens in log *  Findings:   as expected.  Complications: None.  Implants:   Implant Name Type Inv. Item Serial No.  Lot No. LRB No. Used Action   EYE IMP VALVE BAERVELDT 350 -552 - [id number] Lens/Eye Implant EYE IMP VALVE BAERVELDT 350 -427 [id number] [employer] -[id number] Left 1 Implanted   EYE CORNEA PROCESS FEE FOR [facility] - [id number] Lens/Eye Implant EYE CORNEA PROCESS FEE FOR [facility]  [id number] [facility] EYE [id number] Left 1 Implanted

## 2022-12-10 NOTE — ANESTHESIA CARE TRANSFER NOTE
Patient: Parvez Vásquez    Procedure: Procedure(s):  LEFT INSERTION, BAERVELDT  IMPLANT, EYE       Diagnosis: Primary open angle glaucoma (POAG) of left eye, severe stage [H40.1123]  Diagnosis Additional Information: No value filed.    Anesthesia Type:   General     Note:    Oropharynx: oropharynx clear of all foreign objects and spontaneously breathing  Level of Consciousness: awake  Oxygen Supplementation: face mask  Level of Supplemental Oxygen (L/min / FiO2): 8  Independent Airway: airway patency satisfactory and stable  Dentition: dentition unchanged  Vital Signs Stable: post-procedure vital signs reviewed and stable  Report to RN Given: handoff report given  Patient transferred to: PACU    Handoff Report: Identifed the Patient, Identified the Reponsible Provider, Reviewed the pertinent medical history, Discussed the surgical course, Reviewed Intra-OP anesthesia mangement and issues during anesthesia, Set expectations for post-procedure period and Allowed opportunity for questions and acknowledgement of understanding      Vitals:  Vitals Value Taken Time   BP     Temp     Pulse 75 12/09/22 1929   Resp 20 12/09/22 1929   SpO2 99 % 12/09/22 1929   Vitals shown include unvalidated device data.    Electronically Signed By: ERIC Santos CRNA  December 9, 2022  7:30 PM

## 2022-12-11 RX ORDER — ACETAZOLAMIDE 250 MG/1
250 TABLET ORAL 2 TIMES DAILY
Qty: 30 TABLET | Refills: 3 | Status: CANCELLED | OUTPATIENT
Start: 2022-12-11

## 2022-12-12 ENCOUNTER — TELEPHONE (OUTPATIENT)
Dept: OPHTHALMOLOGY | Facility: CLINIC | Age: 62
End: 2022-12-12

## 2022-12-12 DIAGNOSIS — H40.1133 PRIMARY OPEN ANGLE GLAUCOMA (POAG) OF BOTH EYES, SEVERE STAGE: Primary | ICD-10-CM

## 2022-12-12 LAB
ATRIAL RATE - MUSE: 70 BPM
DIASTOLIC BLOOD PRESSURE - MUSE: NORMAL MMHG
INTERPRETATION ECG - MUSE: NORMAL
P AXIS - MUSE: 60 DEGREES
PR INTERVAL - MUSE: 190 MS
QRS DURATION - MUSE: 76 MS
QT - MUSE: 406 MS
QTC - MUSE: 438 MS
R AXIS - MUSE: -12 DEGREES
SYSTOLIC BLOOD PRESSURE - MUSE: NORMAL MMHG
T AXIS - MUSE: -2 DEGREES
VENTRICULAR RATE- MUSE: 70 BPM

## 2022-12-12 RX ORDER — ACETAZOLAMIDE 250 MG/1
250 TABLET ORAL 2 TIMES DAILY
Qty: 30 TABLET | Refills: 3 | Status: SHIPPED | OUTPATIENT
Start: 2022-12-12 | End: 2023-04-11

## 2022-12-12 NOTE — TELEPHONE ENCOUNTER
"TELEPHONE NOTE    Called patient to inform him that his diamox was sent to [facility]. Patient states that his operative eye is \"blurrier.\" He clarified that this eye has been blurry since the surgery, possibly slightly blurrier today compared to yesterday, but no significant changes since his POD1 visit. The eye is sore but no increase in pain. Reviewed return precautions including any significant change in vision or pain, and reviewed his current medication regimen. He is scheduled for Thursday in clinic but will call or present to the ED if he has any changing/worsening symptoms.     Eliezer Kenyon MD  Ophthalmology, PGY-3    "

## 2022-12-12 NOTE — ANESTHESIA POSTPROCEDURE EVALUATION
Patient: Parvez Vásquez    Procedure: Procedure(s):  LEFT INSERTION, BAERVELDT  IMPLANT, EYE       Anesthesia Type:  General    Note:  Disposition: Outpatient   Postop Pain Control: Uneventful            Sign Out: Well controlled pain   PONV: No   Neuro/Psych: Uneventful            Sign Out: Acceptable/Baseline neuro status   Airway/Respiratory: Uneventful            Sign Out: Acceptable/Baseline resp. status   CV/Hemodynamics: Uneventful            Sign Out: Acceptable CV status; No obvious hypovolemia; No obvious fluid overload   Other NRE: NONE   DID A NON-ROUTINE EVENT OCCUR? No           Last vitals:  Vitals Value Taken Time   /85 12/09/22 2000   Temp 36.2  C (97.1  F) 12/09/22 2000   Pulse 68 12/09/22 2000   Resp 26 12/09/22 2000   SpO2 98 % 12/09/22 2000       Electronically Signed By: Nora Parks MD  December 11, 2022  6:46 PM

## 2022-12-13 NOTE — OP NOTE
Parvez [facility]  [id number]  12/9/2022    PREOPERATIVE DIAGNOSIS: Primary Open Angle Glaucoma, left eye, Severe Stage     POSTOPERATIVE DIAGNOSIS:Same as pre-operative diagnosis    OPERATION PERFORMED: Procedure(s):  LEFT INSERTION, BAERVELDT  IMPLANT, EYE AND CORNEAL PATCH GRAFT    SURGEON:  Carrillo Patel MD- Primary Surgeon  Eliezer Kenyon MD- Assisting Resident     ANESTHESIA: General endotracheal    COMPLICATIONS:  none    FINDINGS:  Anatomy as expected    ESTIMATED BLOOD LOSS:   minimal    SPECIMENS:  * No specimens in log *    IMPLANTS:  Implant Name Type Inv. Item Serial No.  Lot No. LRB No. Used Action   EYE IMP VALVE BAERVELDT 350 -873 - [id number] Lens/Eye Implant EYE IMP VALVE BAERVELDT 350 -720 [id number] [employer] -476 Left 1 Implanted   EYE IMP GRAFT CORNEA 1/2 HALO 1/2 THICK HCO-HH1 - [id number] Bone/Tissue/Biologic EYE IMP GRAFT CORNEA 1/2 HALO 1/2 THICK HCO-HH1 [id number]  [id number] Left 1 Implanted       PROCEDURE:  Betadine paint and drop in holding room  Prep and drape in usual manner in OR  Time out according to protocol  Superotemporal traction suture 7-0 vicryl  Superotemporal fornix-based conjunctival flap.  Bleeding controlled with cautery  Adequate pocket created using blunt and sharp dissection.   Superior rectus and lateral rectusmuscles isolated.  Baerveldt implant inserted under the muscles  Baerveldt implant sutured onto the globe using 8-0 nylon sutures with the knot rotated into the well of the implant.  Tube ligated with 7-0 vicryl and tested for adequacy of ligature with balanced salt solution on 30 gauge cannula  Tube trimmed with bevel up  23 gauge needle track made into the anterior chamber   Tube threaded into the eye  Tube secured to globe with an 7-0 vicryl suture  Two fenestrations made with a spatula needle   Corneal patch graft placed over the anterior portion of the tube and secured with 7-0  vicryl  Conjunctiva closed with 7-0 vicryl interrupted sutures  Subconjunctival injection of antibiotic and steroid  Subtenon anesthesia   Removal of traction suture  Topical antibiotic/steroid ointment  Dry sterile dressing  The patient tolerated the procedure well. There were no unexpected findings or complications.

## 2022-12-15 ENCOUNTER — TELEPHONE (OUTPATIENT)
Dept: OPHTHALMOLOGY | Facility: CLINIC | Age: 62
End: 2022-12-15

## 2022-12-15 NOTE — TELEPHONE ENCOUNTER
Impression: Corneal transplant status: Z94.7.
- s/p phaco-DMEK OD 4/8/21, OS 5/20/21 Plan: POM#4.5 OD, doing well, graft C/C. Continue PF BID until _8_/_8_/__21__, then decrease PF to QD OD,   ATs prn. Precautions reviewed. POM#2 OS, doing well, graft C/C. Continue PF TID until _8_/_20_/_21___, then decrease PF to BID OD, d/c Ketorolac. ATs prn. Precautions reviewed. Returned Keya's voicemail to rescjoselin Hannon's 1 week post-op for today. Keya spoke with Rosemarie in Dr. Carrillo Patel's clinic and rescheduled to tomorrow. Nothing else was needed.

## 2022-12-15 NOTE — TELEPHONE ENCOUNTER
The patient is concerned about his dark vision.  He had procedure last week.  The patient wonders if his vision will come back when the tube is open.

## 2022-12-15 NOTE — TELEPHONE ENCOUNTER
Called and spoke with Parvez and then Keya.     Patient missed his appointment today. Patient is a post op and needs to be seen.     Scheduled him for 12/16 at 11:15am with Dr. Patel.     Rosemarie Ashby, COT 10:14 AM 12/15/2022

## 2022-12-16 ENCOUNTER — OFFICE VISIT (OUTPATIENT)
Dept: OPHTHALMOLOGY | Facility: CLINIC | Age: 62
End: 2022-12-16
Attending: OPHTHALMOLOGY
Payer: COMMERCIAL

## 2022-12-16 DIAGNOSIS — Z98.890 POST-OPERATIVE STATE: Primary | ICD-10-CM

## 2022-12-16 PROCEDURE — 99024 POSTOP FOLLOW-UP VISIT: CPT | Performed by: OPHTHALMOLOGY

## 2022-12-16 PROCEDURE — G0463 HOSPITAL OUTPT CLINIC VISIT: HCPCS

## 2022-12-16 ASSESSMENT — VISUAL ACUITY
OS_CC+: -1
OS_CC: 20/70
OD_CC: NLP
METHOD: SNELLEN - LINEAR

## 2022-12-16 ASSESSMENT — TONOMETRY
OS_IOP_MMHG: 30
IOP_METHOD: APPLANATION
OD_IOP_MMHG: 07

## 2022-12-16 ASSESSMENT — SLIT LAMP EXAM - LIDS
COMMENTS: NORMAL
COMMENTS: NORMAL

## 2022-12-16 ASSESSMENT — EXTERNAL EXAM - LEFT EYE: OS_EXAM: NORMAL

## 2022-12-16 ASSESSMENT — EXTERNAL EXAM - RIGHT EYE: OD_EXAM: NORMAL

## 2022-12-16 NOTE — PATIENT INSTRUCTIONS
Eyedrops:  Latanoprost (teal top) at bedtime both eyes.   Timolol (yellow top) 2 times per day in both eyes.    Brimonidine (purple top) 3 times per day in both eyes.   Decrease Prednisolone (white top) eyedrop to 3 times per day in LEFT eye.   Stop Vigamox.   Increase diamox pills to 250 mg 2 tablets twice per day  Call if you have increased pain, loss of vision, or other issues.

## 2022-12-16 NOTE — PROGRESS NOTES
Chief Complaint/Presenting Concern: severe POAG each eye, referred by Dr. Kurtz, s/p Left BGI 12/9/22.     History of Present Illness:   Parvez Vásquez is a 61 year old patient who presents for evaluation of glaucoma.  Referred by Dr. Kurtz for evaluation of severe glaucoma in both eyes. Patient reports that he was diagnosed with glaucoma in both eyes about 15 years ago. Patient has a complicated history of glaucoma and cataract surgeries in the right eye and now has NLP vision in the right eye. left eye has been progressing over the years but patient is hesitant to have surgery in the left eye due to poor outcome after surgery in the right eye.     12/15/22: POW1 s/p left BGI 12/9/22, doing well, some blurring of vision, no pain.       Relevant Past Medical/Family/Social History:  PMH: Non ischemic cardiopmyopathy, htn, AVNRT s/p debrillator  FH: non contributory     Relevant Review of Systems: none     Diagnosis: Primary open angle glaucoma  severe left eye   Year diagnosis: 2007  Previous glaucoma surgery/laser:  - Ahmed right eye (2011, [facility])  - CEIOL, lysis of iridocorneal adhesions, IFIS, - trabeculectomy/[facility] right eye (2012, [facility])  Maximum intraocular pressure: unknown/32 mmHg   Family history: negative  CCT: 55/3 /523  Gonio: open to scleral spur 360;   Refractive status: low myope  Trauma history: negative  Steroid exposure: positive  Vasospastic disease: Migrane/Raynaud phenomenon: negative  A past hemodynamic crisis or Low BP: negative  Meds AEs/intolerance: unknown   PMHx: cardiomyopathy   Anticoagulants: none    - Visual field August 11, 2022  - Right eye - unable  - Left eye - generalized depression high FP/FN  - OCT Macula August 11, 2022  - Right Eye: unable  - Left Eye: no subretinal or intraretinal fluid, complete PVD  - OCT Optic Nerve RNFL Spectralis August 11, 2022  - Right Eye: unable  - Left Eye: Diffuse RNFL thinning, floor effect    Today's testing:  - IOP 7/30 mmHg   Tube in good  position, well covered.     Additional Ocular History:     2. Cataract, left eye    3. PCIOL right eye, sulcus lens  - surgery complicated by IFIS and iris prolapse    4. NLP right eye   Total RD     Plan/Recommendations:    Discussed findings with patient.    Patient with advanced glaucoma left eye with uncontrolled IOP requiring oral Diamox with evidence of progression over time, now status post left BGI surgery 12/9/22. Avoided cataract surgery at time of glaucoma surgery given history of complication in the right eye, plan for cataract surgery after IOP is well controlled.    POW1 s/p left BGI 12/9/22 doing well overall. IOP remains elevated pending tube opening    Left eye post-op medications:  o Continue latanoprost in left eye.   o Continue timolol in left eye.   o Continue brimonidine in left eye.   o Continue prednisolone eyedrop 4 times per day in left eye.   o Continue vigamox eyedrop 4 times per day in left eye.   o Increase Diamox to 500 mg BID    Continue current medications in right eye:   o Latanoprost at bedtime left eye.   o Timolol BID left eye.  o Brimonidine TID left eye.    Post-op return precautions reviewed.    Follow-[facility] in [facility] weeks [facility]      Physician Attestation     Attending Physician Attestation:  Complete documentation of historical and exam elements from today's encounter can be found in the full encounter summary report (not reduplicated in this progress note). I personally obtained the chief complaint(s) and history of present illness. I confirmed and edited as necessary the review of systems, past medical/surgical history, family history, social history, and examination findings as documented by others; and I examined the patient myself. I personally reviewed the relevant tests, images, and reports as documented above. II formulated and edited as necessary the assessment and plan and discussed the findings and management plan with the patient and any family members present at the  time of the visit.  Carrillo Patel M.D., Glaucoma, December 17, 2022

## 2022-12-16 NOTE — NURSING NOTE
Chief Complaints and History of Present Illnesses   Patient presents with     Post Op (Ophthalmology) Left Eye     Post insertion of Baerveldt implant with corneal patch graft into left eye on 12/09/2022     Chief Complaint(s) and History of Present Illness(es)     Post Op (Ophthalmology) Left Eye            Laterality: left eye    Associated symptoms: glare, eye pain, redness and tearing (intermittent)    Treatments tried: eye drops and artificial tears    Pain scale: 3/10    Comments: Post insertion of Baerveldt implant with corneal patch graft into left eye on 12/09/2022          Comments    Patient returns to clinic for a one week post operative left eye exam.  He tells me that his vision in the left eye now appears bright and has glare.    Celeste Garcia, KRYSTYNA 11:30 AM  December 16, 2022

## 2023-01-09 NOTE — PROGRESS NOTES
Chief Complaint/Presenting Concern: severe POAG each eye, referred by Dr. Kurtz, s/p Left BGI 12/9/22.     History of Present Illness:   Parvez Vásquez is a 61 year old patient who presents for evaluation of glaucoma.  Referred by Dr. Kurtz for evaluation of severe glaucoma in both eyes. Patient reports that he was diagnosed with glaucoma in both eyes about 15 years ago. Patient has a complicated history of glaucoma and cataract surgeries in the right eye and now has NLP vision in the right eye. left eye has been progressing over the years but patient is hesitant to have surgery in the left eye due to poor outcome after surgery in the right eye.     1/10/23: POM1 s/p left BGI 12/9/22, doing well. Vision still blurry in the left eye     Relevant Past Medical/Family/Social History:  PMH: Non ischemic cardiopmyopathy, htn, AVNRT s/p debrillator  FH: non contributory     Relevant Review of Systems: none     Diagnosis: Primary open angle glaucoma  severe left eye   Year diagnosis: 2007  Previous glaucoma surgery/laser:  - Ahmed right eye (2011, [facility])  - CEIOL, lysis of iridocorneal adhesions, IFIS, - trabeculectomy/[facility] right eye (2012, [facility])  Maximum intraocular pressure: unknown/32 mmHg   Family history: negative  CCT: 55/3 /523  Gonio: open to scleral spur 360;   Refractive status: low myope  Trauma history: negative  Steroid exposure: positive  Vasospastic disease: Migrane/Raynaud phenomenon: negative  A past hemodynamic crisis or Low BP: negative  Meds AEs/intolerance: unknown   PMHx: cardiomyopathy   Anticoagulants: none    - Visual field August 11, 2022  - Right eye - unable  - Left eye - generalized depression high FP/FN  - OCT Macula August 11, 2022  - Right Eye: unable  - Left Eye: no subretinal or intraretinal fluid, complete PVD  - OCT Optic Nerve RNFL Spectralis August 11, 2022  - Right Eye: unable  - Left Eye: Diffuse RNFL thinning, floor effect    Today's testing:  - IOP 12/22 mmHg   Tube in good  position, well covered.     Additional Ocular History:     2. Cataract, left eye    3. PCIOL right eye, sulcus lens  - surgery complicated by IFIS and iris prolapse    4. NLP right eye   Total RD     Plan/Recommendations:    Discussed findings with patient.    Patient with advanced glaucoma left eye with uncontrolled IOP requiring oral Diamox with evidence of progression over time, now status post left BGI surgery 12/9/22. Avoided cataract surgery at time of glaucoma surgery given history of complication in the right eye, plan for cataract surgery after IOP is well controlled.    POM1 s/p left BGI 12/9/22 doing well overall. IOP remains elevated pending tube opening    Left eye post-op medications:  o Continue latanoprost in left eye.   o Continue timolol in left eye.   o Continue brimonidine in left eye.   o Continue prednisolone eyedrop 4 times per day in left eye.   o Continue vigamox eyedrop 4 times per day in left eye.   o Diamox 250 mg BID until 17th    Continue current medications in right eye:   o Latanoprost at bedtime left eye.   o Timolol BID left eye.  o Brimonidine TID left eye.    Post-op return precautions reviewed.    Follow-up in [facility] weeks [facility]      Physician Attestation     Attending Physician Attestation:  Complete documentation of historical and exam elements from today's encounter can be found in the full encounter summary report (not reduplicated in this progress note). I personally obtained the chief complaint(s) and history of present illness. I confirmed and edited as necessary the review of systems, past medical/surgical history, family history, social history, and examination findings as documented by others; and I examined the patient myself. I personally reviewed the relevant tests, images, and reports as documented above. I formulated and edited as necessary the assessment and plan and discussed the findings and management plan with the patient and any family members present at the  time of the visit.  Carrillo Patel M.D., Glaucoma, January 11, 2023

## 2023-01-10 ENCOUNTER — OFFICE VISIT (OUTPATIENT)
Dept: OPHTHALMOLOGY | Facility: CLINIC | Age: 63
End: 2023-01-10
Attending: OPHTHALMOLOGY
Payer: COMMERCIAL

## 2023-01-10 DIAGNOSIS — Z98.890 POSTOPERATIVE EYE STATE: ICD-10-CM

## 2023-01-10 PROCEDURE — 99024 POSTOP FOLLOW-UP VISIT: CPT | Performed by: OPHTHALMOLOGY

## 2023-01-10 PROCEDURE — G0463 HOSPITAL OUTPT CLINIC VISIT: HCPCS

## 2023-01-10 RX ORDER — MOXIFLOXACIN 5 MG/ML
1 SOLUTION/ DROPS OPHTHALMIC 4 TIMES DAILY
Qty: 3 ML | Refills: 1 | Status: SHIPPED | OUTPATIENT
Start: 2023-01-10 | End: 2023-04-11

## 2023-01-10 RX ORDER — PREDNISOLONE ACETATE 10 MG/ML
1 SUSPENSION/ DROPS OPHTHALMIC 4 TIMES DAILY
Qty: 15 ML | Refills: 1 | Status: SHIPPED | OUTPATIENT
Start: 2023-01-10 | End: 2024-04-30

## 2023-01-10 ASSESSMENT — VISUAL ACUITY
METHOD: SNELLEN - LINEAR
OS_SC: 20/150
OS_PH_SC: 20/50
OD_SC: NLP
OS_PH_SC+: -1

## 2023-01-10 ASSESSMENT — TONOMETRY
OD_IOP_MMHG: 12
OS_IOP_MMHG: 22
IOP_METHOD: TONOPEN

## 2023-01-10 ASSESSMENT — SLIT LAMP EXAM - LIDS
COMMENTS: NORMAL
COMMENTS: NORMAL

## 2023-01-10 ASSESSMENT — CONF VISUAL FIELD
OS_SUPERIOR_TEMPORAL_RESTRICTION: 3
OS_INFERIOR_NASAL_RESTRICTION: 3
OS_SUPERIOR_NASAL_RESTRICTION: 3
OS_INFERIOR_TEMPORAL_RESTRICTION: 3
OD_INFERIOR_TEMPORAL_RESTRICTION: 1
OD_SUPERIOR_TEMPORAL_RESTRICTION: 1
OD_INFERIOR_NASAL_RESTRICTION: 1
OD_SUPERIOR_NASAL_RESTRICTION: 1

## 2023-01-10 ASSESSMENT — REFRACTION_WEARINGRX
OS_AXIS: 165
OS_CYLINDER: +3.00
OD_SPHERE: BALANCE
SPECS_TYPE: BIFOCAL
OS_SPHERE: -3.00
OS_ADD: +3.00

## 2023-01-10 ASSESSMENT — EXTERNAL EXAM - RIGHT EYE: OD_EXAM: NORMAL

## 2023-01-10 ASSESSMENT — EXTERNAL EXAM - LEFT EYE: OS_EXAM: NORMAL

## 2023-01-10 NOTE — PATIENT INSTRUCTIONS
Eyedrops:  Latanoprost (teal top) at bedtime both eyes.   Timolol (yellow top) 2 times per day in both eyes.    Brimonidine (purple top) 3 times per day in both eyes.   Continue Prednisolone (white top) eyedrop to 4 times per day in LEFT eye.  Restart Vigamox 1 drop 4 times daily for 4 days   Diamox pills to 250 mg 1 tablets twice per day until Tuesday the 17th   Call if you have increased pain, loss of vision, or other issues.

## 2023-01-10 NOTE — NURSING NOTE
Chief Complaints and History of Present Illnesses   Patient presents with     Post Op (Ophthalmology) Left Eye     s/p left BGI 12/9/22     Chief Complaint(s) and History of Present Illness(es)     Post Op (Ophthalmology) Left Eye            Laterality: left eye    Onset: 1 month ago    Associated symptoms: dryness, eye pain and itching.  Negative for tearing, flashes, floaters and burning    Pain scale: 2/10    Comments: s/p left BGI 12/9/22          Comments    Pt thinks his vision is cloudier since last visit.   Pt is sensitive to light, but does not cause him any pain.     Gtts:    Left eye post-op medications:    Continue latanoprost in left eye.     Continue timolol in left eye.     Continue brimonidine in left eye.     Continue prednisolone eyedrop 4 times per day in left eye.     Continue vigamox eyedrop 4 times per day in left eye.     Increase Diamox to 500 mg BID      Continue current medications in right eye:     Latanoprost at bedtime left eye.     Timolol BID left eye.    Brimonidine TID left eye.    TORSTEN PEREIRA COA January 10, 2023 9:44 AM

## 2023-01-13 ENCOUNTER — TELEPHONE (OUTPATIENT)
Dept: OPHTHALMOLOGY | Facility: CLINIC | Age: 63
End: 2023-01-13
Payer: COMMERCIAL

## 2023-01-13 NOTE — TELEPHONE ENCOUNTER
LM on VM.     Per Dr. Kenyon, pt needs to be seen on 1/17 instead of 1/19. Told pt to come in at 10am on 1/17. He will call back if that time does not work for him.     Rosemarie Ashby, KRYSTYNA 10:11 AM 01/13/2023

## 2023-01-17 NOTE — TELEPHONE ENCOUNTER
Dr. Thompson called. Patient was not able to make it today (1/17), but will come in on 1/19.     Rosemarie Ashby, COT 3:29 PM 01/17/2023

## 2023-01-17 NOTE — PROGRESS NOTES
Chief Complaint/Presenting Concern: severe POAG each eye, referred by Dr. Kurtz, s/p Left BGI 12/9/22.     History of Present Illness:   Parvez Vásquez is a 61 year old patient who presents for evaluation of glaucoma.  Referred by Dr. Kurtz for evaluation of severe glaucoma in both eyes. Patient reports that he was diagnosed with glaucoma in both eyes about 15 years ago. Patient has a complicated history of glaucoma and cataract surgeries in the right eye and now has NLP vision in the right eye. left eye has been progressing over the years but patient is hesitant to have surgery in the left eye due to poor outcome after surgery in the right eye.     1/17/23: POW6 s/p left BGI 12/9/22, left eye vision seems blurrier since last visit. No pain.   Current drops patient reports he is taking:    Left eye post-op medications:    o latanoprost in left eye.   o timolol once daily in left eye.   o Taking brimonidine once daily left eye.   o Prednisolone eyedrop 4 times per day in left eye.   o Done with vigamox.   o Diamox 250 mg BID until 17th.     current medications in right eye:   o Latanoprost at bedtime left eye.   o Timolol BID left eye.  o Brimonidine TID left eye.    Relevant Past Medical/Family/Social History:  PMH: Non ischemic cardiopmyopathy, htn, AVNRT s/p debrillator  FH: non contributory     Relevant Review of Systems: none     Diagnosis: Primary open angle glaucoma  severe left eye   Year diagnosis: 2007  Previous glaucoma surgery/laser:  - Ahmed right eye (2011, [facility])  - CEIOL, lysis of iridocorneal adhesions, IFIS, - trabeculectomy/[facility] right eye (2012, [facility])  Maximum intraocular pressure: unknown/32 mmHg   Family history: negative  CCT: 55/3 /523  Gonio: open to scleral spur 360;   Refractive status: low myope  Trauma history: negative  Steroid exposure: positive  Vasospastic disease: Migrane/Raynaud phenomenon: negative  A past hemodynamic crisis or Low BP: negative  Meds AEs/intolerance: unknown    PMHx: cardiomyopathy   Anticoagulants: none    - Visual field August 11, 2022  - Right eye - unable  - Left eye - generalized depression high FP/FN  - OCT Macula August 11, 2022  - Right Eye: unable  - Left Eye: no subretinal or intraretinal fluid, complete PVD  - OCT Optic Nerve RNFL Spectralis August 11, 2022  - Right Eye: unable  - Left Eye: Diffuse RNFL thinning, floor effect    Today's testing:  - IOP 07/10 mmHg   - Left eye tube in good position, well covered. Bleb formed in left eye, with 3+ AC cell and fibrin over anterior lens capsule.     Additional Ocular History:     2. Cataract, left eye    3. PCIOL right eye, sulcus lens  - surgery complicated by IFIS and iris prolapse    4. NLP right eye   Total RD     Plan/Recommendations:    Discussed findings with patient.    Patient with advanced glaucoma left eye with uncontrolled IOP requiring oral Diamox with evidence of progression over time, now status post left BGI surgery 12/9/22. Avoided cataract surgery at time of glaucoma surgery given history of complication in the right eye, plan for cataract surgery after IOP is well controlled.    POW 6 s/p left BGI 12/9/22, tube appears opened with bleb and left IOP down to 10 today and AC cell with fibrin of lens anterior capsule. Vision pinholes to 20/125, likely decreased due to fibrin, no hyphema, no vit heme.     Left eye post-op medications:   o Continue latanoprost once daily at night in left eye.   o Continue timolol once daily in left eye.   o Continue brimonidine daily in left eye.   o Increase the prednisolone eyedrop to 5 times per day in left eye for one week, then decrease to 4x/day in left eye for one week, then decrease to 3x/day in left eye for one week, then decrease to 2x/day in left eye for one week, then decrease to 1x/day in left eye for one week.    o Holding diamox.     Continue current medications in right eye:   o Latanoprost at bedtime right eye.   o Timolol BID right eye.  o Brimonidine  TID right eye.    Post-op return precautions reviewed.    Follow[facility] 2 [facility]      Discussed with Dr. Patel.    Eliezer Kenyon MD  Ophthalmology, PGY-3

## 2023-01-19 ENCOUNTER — OFFICE VISIT (OUTPATIENT)
Dept: OPHTHALMOLOGY | Facility: CLINIC | Age: 63
End: 2023-01-19
Attending: OPHTHALMOLOGY
Payer: COMMERCIAL

## 2023-01-19 DIAGNOSIS — H40.1133 PRIMARY OPEN ANGLE GLAUCOMA (POAG) OF BOTH EYES, SEVERE STAGE: ICD-10-CM

## 2023-01-19 DIAGNOSIS — Z98.890 POSTOPERATIVE EYE STATE: Primary | ICD-10-CM

## 2023-01-19 PROCEDURE — G0463 HOSPITAL OUTPT CLINIC VISIT: HCPCS | Mod: 25

## 2023-01-19 RX ORDER — TIMOLOL MALEATE 5 MG/ML
1 SOLUTION/ DROPS OPHTHALMIC 2 TIMES DAILY
Qty: 5 ML | Refills: 3 | Status: SHIPPED | OUTPATIENT
Start: 2023-01-19 | End: 2023-02-02

## 2023-01-19 RX ORDER — LATANOPROST 50 UG/ML
1 SOLUTION/ DROPS OPHTHALMIC EVERY EVENING
Qty: 2.5 ML | Refills: 7 | Status: SHIPPED | OUTPATIENT
Start: 2023-01-19 | End: 2023-02-02

## 2023-01-19 ASSESSMENT — EXTERNAL EXAM - RIGHT EYE: OD_EXAM: NORMAL

## 2023-01-19 ASSESSMENT — CONF VISUAL FIELD
OD_INFERIOR_TEMPORAL_RESTRICTION: 1
OD_SUPERIOR_TEMPORAL_RESTRICTION: 1
OS_SUPERIOR_NASAL_RESTRICTION: 3
OS_SUPERIOR_TEMPORAL_RESTRICTION: 3
OS_INFERIOR_NASAL_RESTRICTION: 3
OS_INFERIOR_TEMPORAL_RESTRICTION: 3
OD_INFERIOR_NASAL_RESTRICTION: 1
OD_SUPERIOR_NASAL_RESTRICTION: 1

## 2023-01-19 ASSESSMENT — VISUAL ACUITY
OS_SC: 20/200
OS_PH_SC: 20/125
METHOD: SNELLEN - LINEAR
OD_SC: NLP

## 2023-01-19 ASSESSMENT — TONOMETRY
OD_IOP_MMHG: 12
OS_IOP_MMHG: 14
OS_IOP_MMHG: 10
IOP_METHOD: APPLANATION
OD_IOP_MMHG: 07
IOP_METHOD: TONOPEN

## 2023-01-19 ASSESSMENT — EXTERNAL EXAM - LEFT EYE: OS_EXAM: NORMAL

## 2023-01-19 ASSESSMENT — CUP TO DISC RATIO: OS_RATIO: 0.85

## 2023-01-19 ASSESSMENT — SLIT LAMP EXAM - LIDS
COMMENTS: NORMAL
COMMENTS: NORMAL

## 2023-01-19 NOTE — PATIENT INSTRUCTIONS
LEFT eye post-op medications:   Increase the prednisolone eyedrop to 5 times per day in left eye for one week, then decrease to 4x/day in left eye for one week, then decrease to 3x/day in left eye for one week, then decrease to 2x/day in left eye for one week, then decrease to 1x/day in left eye for one week.    Continue latanoprost once daily at night in left eye.   Continue timolol once daily in left eye.   Continue brimonidine once daily in left eye.   Continue current medications in right eye:   Latanoprost at bedtime right eye.   Timolol 2 times per day in right eye.  Brimonidine 3 times per day in right eye.    Do not take any diamox pills.     Follow-up in 2 weeks.

## 2023-01-19 NOTE — NURSING NOTE
Chief Complaints and History of Present Illnesses   Patient presents with     Follow Up     Chief Complaint(s) and History of Present Illness(es)     Follow Up           Comments    Patient states that his vision in the LE fluctuates. He states that some days his vision is better and some days it feels worse. He states that he does get slight pain here and there. No often. No flashes of light.     Ocular Meds:latanoprost in left eye.  timolol in left eye.    brimonidine in left eye  prednisolone eyedrop 4 times per day in left eye.   vigamox eyedrop 4 times per day in left eye.   Latanoprost at bedtime left eye    Marlon GREENWOOD, January 19, 2023 10:15 AM

## 2023-02-02 ENCOUNTER — OFFICE VISIT (OUTPATIENT)
Dept: OPHTHALMOLOGY | Facility: CLINIC | Age: 63
End: 2023-02-02
Attending: OPHTHALMOLOGY
Payer: COMMERCIAL

## 2023-02-02 DIAGNOSIS — Z98.890 POSTOPERATIVE EYE STATE: ICD-10-CM

## 2023-02-02 DIAGNOSIS — H40.1133 PRIMARY OPEN ANGLE GLAUCOMA OF BOTH EYES, SEVERE STAGE: ICD-10-CM

## 2023-02-02 DIAGNOSIS — H40.1133 PRIMARY OPEN ANGLE GLAUCOMA (POAG) OF BOTH EYES, SEVERE STAGE: ICD-10-CM

## 2023-02-02 DIAGNOSIS — Z98.890 POSTOPERATIVE EYE STATE: Primary | ICD-10-CM

## 2023-02-02 PROCEDURE — 99024 POSTOP FOLLOW-UP VISIT: CPT | Performed by: OPHTHALMOLOGY

## 2023-02-02 PROCEDURE — 92015 DETERMINE REFRACTIVE STATE: CPT

## 2023-02-02 PROCEDURE — G0463 HOSPITAL OUTPT CLINIC VISIT: HCPCS

## 2023-02-02 PROCEDURE — 92134 CPTRZ OPH DX IMG PST SGM RTA: CPT | Performed by: OPHTHALMOLOGY

## 2023-02-02 PROCEDURE — 99207 OCT RETINA SPECTRALIS OU (BOTH EYE): CPT | Mod: 26 | Performed by: OPHTHALMOLOGY

## 2023-02-02 RX ORDER — TIMOLOL MALEATE 5 MG/ML
1 SOLUTION/ DROPS OPHTHALMIC 2 TIMES DAILY
Qty: 10 ML | Refills: 11 | Status: SHIPPED | OUTPATIENT
Start: 2023-02-02 | End: 2024-04-30

## 2023-02-02 RX ORDER — BRIMONIDINE TARTRATE 2 MG/ML
1 SOLUTION/ DROPS OPHTHALMIC 3 TIMES DAILY
Qty: 10 ML | Refills: 11 | Status: SHIPPED | OUTPATIENT
Start: 2023-02-02 | End: 2024-04-30

## 2023-02-02 RX ORDER — LATANOPROST 50 UG/ML
1 SOLUTION/ DROPS OPHTHALMIC EVERY EVENING
Qty: 2.5 ML | Refills: 11 | Status: SHIPPED | OUTPATIENT
Start: 2023-02-02 | End: 2024-01-30

## 2023-02-02 ASSESSMENT — VISUAL ACUITY
OS_CC+: +1
METHOD: SNELLEN - LINEAR
OS_CC: 20/60
OS_PH_SC: 20/70
OS_PH_SC+: -2
OS_SC: 20/80
OD_SC: NLP

## 2023-02-02 ASSESSMENT — REFRACTION_WEARINGRX
OS_SPHERE: -3.00
SPECS_TYPE: BIFOCAL
OS_ADD: +3.00
OD_SPHERE: BALANCE
OS_CYLINDER: +3.00
OS_AXIS: 165

## 2023-02-02 ASSESSMENT — TONOMETRY
OS_IOP_MMHG: 22
IOP_METHOD: TONOPEN
OS_IOP_MMHG: 25
OD_IOP_MMHG: 16
IOP_METHOD: APPLANATION

## 2023-02-02 ASSESSMENT — SLIT LAMP EXAM - LIDS
COMMENTS: NORMAL
COMMENTS: NORMAL

## 2023-02-02 ASSESSMENT — REFRACTION_MANIFEST
OS_ADD: +3.00
OS_SPHERE: -2.50
OS_AXIS: 165
OD_SPHERE: BALANCE
OD_ADD: +3.00
OS_CYLINDER: +2.50

## 2023-02-02 ASSESSMENT — CUP TO DISC RATIO: OS_RATIO: 0.85

## 2023-02-02 ASSESSMENT — EXTERNAL EXAM - LEFT EYE: OS_EXAM: NORMAL

## 2023-02-02 ASSESSMENT — EXTERNAL EXAM - RIGHT EYE: OD_EXAM: NORMAL

## 2023-02-02 NOTE — PATIENT INSTRUCTIONS
Increase prednisolone eyedrop to 4 times daily   Continue latanoprost (Green) once daily at night in each eye   Continue timolol (Yellow)1 drop twice daily in each eye  Continue Brimonidine (Purple) 3 times per day in each eye

## 2023-02-02 NOTE — PROGRESS NOTES
"Chief Complaint/Presenting Concern: severe POAG each eye, referred by Dr. Kurtz, s/p Left BGI 12/9/22.     History of Present Illness:   Parvez Vásquez is a 61 year old patient who presents for evaluation of glaucoma.  Referred by Dr. Kurtz for evaluation of severe glaucoma in both eyes. Patient reports that he was diagnosed with glaucoma in both eyes about 15 years ago. Patient has a complicated history of glaucoma and cataract surgeries in the right eye and now has NLP vision in the right eye. left eye has been progressing over the years but patient is hesitant to have surgery in the left eye due to poor outcome after surgery in the right eye.     2/2/23: POW8 s/p left BGI 12/9/22, left eye vision \"still cloudy,\" feels that it fluctuates.   Currently on pred forte down to TID in left eye. Reports currently taking timolol once daily in both eyes, latanoprost once daily in both eyes, brimonidine once daily in only left eye.   .   Current drops patient reports he is taking:    Left eye post-op medications:    o latanoprost in left eye.   o timolol once daily in left eye.   o Taking brimonidine once daily left eye.   o Prednisolone eyedrop 4 times per day in left eye.   o done with diamox     current medications in right eye:   o Latanoprost at bedtime left eye.   o Timolol BID left eye.  o Brimonidine TID left eye.    Relevant Past Medical/Family/Social History:  PMH: Non ischemic cardiopmyopathy, htn, AVNRT s/p debrillator  FH: non contributory     Relevant Review of Systems: none     Diagnosis: Primary open angle glaucoma  severe left eye   Year diagnosis: 2007  Previous glaucoma surgery/laser:  - Ahmed right eye (2011, [facility])  - CEIOL, lysis of iridocorneal adhesions, IFIS, - trabeculectomy/[facility] right eye (2012, [facility])  Maximum intraocular pressure: unknown/32 mmHg   Family history: negative  CCT: 55/3 /523  Gonio: open to scleral spur 360;   Refractive status: low myope  Trauma history: negative  Steroid " exposure: positive  Vasospastic disease: Migrane/Raynaud phenomenon: negative  A past hemodynamic crisis or Low BP: negative  Meds AEs/intolerance: unknown   PMHx: cardiomyopathy   Anticoagulants: none    - Visual field August 11, 2022  - Right eye - unable  - Left eye - generalized depression high FP/FN  - OCT Macula August 11, 2022  - Right Eye: unable  - Left Eye: no subretinal or intraretinal fluid, complete PVD  - OCT Optic Nerve RNFL Spectralis August 11, 2022  - Right Eye: unable  - Left Eye: Diffuse RNFL thinning, floor effect    Today's testing:   - IOP 22 mmHg   - Left eye tube in good position, well covered. Bleb formed in left eye, with 1+ AC cell and no fibrin     Additional Ocular History:     2. Cataract, left eye    3. PCIOL right eye, sulcus lens  - surgery complicated by IFIS and iris prolapse    4. NLP right eye   Total RD     Plan/Recommendations:    Discussed findings with patient.    Patient with advanced glaucoma left eye with uncontrolled IOP requiring oral Diamox with evidence of progression over time, now status post left BGI surgery 12/9/22. Avoided cataract surgery at time of glaucoma surgery given history of complication in the right eye, plan for cataract surgery after IOP is well controlled.    POW 8 s/p left BGI 12/9/22, tube appears opened with bleb, IOP elevated likely hypertensive phase.     Continue latanoprost once daily at night each eye     Continue timolol BID each eye    Continue brimonidine TID each eye     Increase the prednisolone eyedrop to 4 times daily left eye, still has some AC reaction, no macular edema on OCT    Decline in vision could be due to worsening cataract     Post-op return precautions reviewed.    Plan for cataract surgery on next visit, IOP calcs       Physician Attestation     Attending Physician Attestation:  Complete documentation of historical and exam elements from today's encounter can be found in the full encounter summary report (not reduplicated  in this progress note). I personally obtained the chief complaint(s) and history of present illness. I confirmed and edited as necessary the review of systems, past medical/surgical history, family history, social history, and examination findings as documented by others; and I examined the patient myself. I personally reviewed the relevant tests, images, and reports as documented above. I personally reviewed the ophthalmic test(s) associated with this encounter, agree with the interpretation(s) as documented by the resident/fellow and have edited the corresponding report(s) as necessary. I formulated and edited as necessary the assessment and plan and discussed the findings and management plan with the patient and any family members present at the time of the visit.  Carrillo Patel M.D., Glaucoma, February 2, 2023

## 2023-02-02 NOTE — NURSING NOTE
Chief Complaints and History of Present Illnesses   Patient presents with     Post Op (Ophthalmology) Left Eye     Chief Complaint(s) and History of Present Illness(es)     Post Op (Ophthalmology) Left Eye           Comments    POP left eye [place] on 12/09/2022.  The patient reports left eye cloudy vision.  The patient has his drops in possession today.  He is using Timolol in the morning and Latanoprost at bedtime in both eyes.  He is using Brimonidine once daily and Prednisolone three times daily in the left eye.  He has no reports no eye pain.  He has some head pain across forehead at night.  Annika Espinoza, COA, COA 11:00 AM 02/02/2023

## 2023-02-17 DIAGNOSIS — H25.12 AGE-RELATED NUCLEAR CATARACT OF LEFT EYE: Primary | ICD-10-CM

## 2023-02-21 ENCOUNTER — TELEPHONE (OUTPATIENT)
Dept: OPHTHALMOLOGY | Facility: CLINIC | Age: 63
End: 2023-02-21
Payer: COMMERCIAL

## 2023-02-21 RX ORDER — TIMOLOL MALEATE 5 MG/ML
SOLUTION/ DROPS OPHTHALMIC
Qty: 5 ML | Refills: 3 | OUTPATIENT
Start: 2023-02-21
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
## 2023-02-22 ENCOUNTER — TELEPHONE (OUTPATIENT)
Dept: OPHTHALMOLOGY | Facility: CLINIC | Age: 63
End: 2023-02-22
Payer: COMMERCIAL
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
## 2023-02-23 ENCOUNTER — TELEPHONE (OUTPATIENT)
Dept: OPHTHALMOLOGY | Facility: CLINIC | Age: 63
End: 2023-02-23
Payer: COMMERCIAL

## 2023-02-28 ENCOUNTER — OFFICE VISIT (OUTPATIENT)
Dept: OPHTHALMOLOGY | Facility: CLINIC | Age: 63
End: 2023-02-28
Attending: OPHTHALMOLOGY
Payer: COMMERCIAL

## 2023-02-28 DIAGNOSIS — H25.12 AGE-RELATED NUCLEAR CATARACT OF LEFT EYE: Primary | ICD-10-CM

## 2023-02-28 DIAGNOSIS — H40.1133 PRIMARY OPEN ANGLE GLAUCOMA OF BOTH EYES, SEVERE STAGE: ICD-10-CM

## 2023-02-28 PROCEDURE — 99024 POSTOP FOLLOW-UP VISIT: CPT | Performed by: OPHTHALMOLOGY

## 2023-02-28 PROCEDURE — G0463 HOSPITAL OUTPT CLINIC VISIT: HCPCS | Mod: 25

## 2023-02-28 RX ORDER — DORZOLAMIDE HYDROCHLORIDE AND TIMOLOL MALEATE 20; 5 MG/ML; MG/ML
1 SOLUTION/ DROPS OPHTHALMIC 2 TIMES DAILY
Qty: 10 ML | Refills: 4 | Status: SHIPPED | OUTPATIENT
Start: 2023-02-28 | End: 2024-01-30

## 2023-02-28 ASSESSMENT — TONOMETRY
IOP_METHOD: TONOPEN
OD_IOP_MMHG: 19
OS_IOP_MMHG: 22
OD_IOP_MMHG: 19
OS_IOP_MMHG: 23
IOP_METHOD: TONOPEN

## 2023-02-28 ASSESSMENT — CONF VISUAL FIELD
OS_SUPERIOR_TEMPORAL_RESTRICTION: 3
OD_INFERIOR_NASAL_RESTRICTION: 1
OS_INFERIOR_TEMPORAL_RESTRICTION: 1
OD_INFERIOR_TEMPORAL_RESTRICTION: 1
OS_INFERIOR_NASAL_RESTRICTION: 1
OD_SUPERIOR_TEMPORAL_RESTRICTION: 1
OD_SUPERIOR_NASAL_RESTRICTION: 1
OS_SUPERIOR_NASAL_RESTRICTION: 3

## 2023-02-28 ASSESSMENT — CUP TO DISC RATIO: OS_RATIO: 0.85

## 2023-02-28 ASSESSMENT — EXTERNAL EXAM - LEFT EYE: OS_EXAM: NORMAL

## 2023-02-28 ASSESSMENT — VISUAL ACUITY
OD_SC: NLP
OS_PH_SC+: -2
METHOD: SNELLEN - LINEAR
OS_SC: 20/80
OS_PH_SC: 20/50

## 2023-02-28 ASSESSMENT — EXTERNAL EXAM - RIGHT EYE: OD_EXAM: NORMAL

## 2023-02-28 ASSESSMENT — SLIT LAMP EXAM - LIDS
COMMENTS: NORMAL
COMMENTS: NORMAL

## 2023-02-28 NOTE — PROGRESS NOTES
Chief Complaint/Presenting Concern: severe POAG each eye, referred by Dr. Kurtz, s/p Left BGI 12/9/22.     History of Present Illness:   Parvez Vásquez is a 61 year old patient who presents for evaluation of glaucoma.  Referred by Dr. Kurtz for evaluation of severe glaucoma in both eyes. Patient reports that he was diagnosed with glaucoma in both eyes about 15 years ago. Patient has a complicated history of glaucoma and cataract surgeries in the right eye and now has NLP vision in the right eye. left eye has been progressing over the years but patient is hesitant to have surgery in the left eye due to poor outcome after surgery in the right eye.     2/28/23: POW12 s/p left BGI 12/9/22  .   Current drops:    Left eye post-op medications:    o Latanoprost nightly in left eye.   o timolol BID left eye.   o Taking brimonidine TID left eye.   o Prednisolone eyedrop 4 times per day in left eye.     current medications in right eye:   o Latanoprost at bedtime left eye.   o Timolol BID left eye.  o Brimonidine TID left eye.    Relevant Past Medical/Family/Social History:  PMH: Non ischemic cardiopmyopathy, htn, AVNRT s/p debrillator  FH: non contributory     Relevant Review of Systems: none     Diagnosis: Primary open angle glaucoma  severe left eye   Year diagnosis: 2007  Previous glaucoma surgery/laser:  - Ahmed right eye (2011, [facility])  - CEIOL, lysis of iridocorneal adhesions, IFIS, - trabeculectomy/[facility] right eye (2012, [facility])  - BGI 12/9/22 left eye  Maximum intraocular pressure: unknown/32 mmHg   Family history: negative  CCT: 55/3 /523  Gonio: open to scleral spur 360;   Refractive status: low myope  Trauma history: negative  Steroid exposure: positive  Vasospastic disease: Migrane/Raynaud phenomenon: negative  A past hemodynamic crisis or Low BP: negative  Meds AEs/intolerance: unknown   PMHx: cardiomyopathy   Anticoagulants: none    - Visual field August 11, 2022  - Right eye - unable  - Left eye -  generalized depression high FP/FN  - OCT Macula August 11, 2022  - Right Eye: unable  - Left Eye: no subretinal or intraretinal fluid, complete PVD  - OCT Optic Nerve RNFL Spectralis August 11, 2022  - Right Eye: unable  - Left Eye: Diffuse RNFL thinning, floor effect    Today's testing:   - IOP:12/23 mmHg     Additional Ocular History:     2. Cataract, left eye    3. PCIOL right eye, sulcus lens  - surgery complicated by IFIS and iris prolapse    4. NLP right eye   Total RD     Plan/Recommendations:    Discussed findings with patient.    Patient with advanced glaucoma left eye with uncontrolled IOP requiring oral Diamox with evidence of progression over time, now status post left BGI surgery 12/9/22. Avoided cataract surgery at time of glaucoma surgery given history of complication in the right eye, plan for cataract surgery after IOP is well controlled.    POW 12 s/p left BGI 12/9/22, tube appears opened with bleb, IOP elevated likely hypertensive phase.     Continue latanoprost once daily at night each eye     Switch from timolol to Cosopt BID each eye    Continue brimonidine TID each eye     Increase the prednisolone eyedrop to 3 times daily left eye, still has some AC reaction, no macular edema on OCT    Decline in vision could be due to worsening cataract     Post-op return precautions reviewed.    Plan for cataract surgery in 2 months     Candy Jenkins MD  Ophthalmology Resident, PGY-3  [facility]        Physician Attestation     Attending Physician Attestation:  Complete documentation of historical and exam elements from today's encounter can be found in the full encounter summary report (not reduplicated in this progress note). I personally obtained the chief complaint(s) and history of present illness. I confirmed and edited as necessary the review of systems, past medical/surgical history, family history, social history, and examination findings as documented by others; and I examined the  patient myself. I personally reviewed the relevant tests, images, and reports as documented above. I formulated and edited as necessary the assessment and plan and discussed the findings and management plan with the patient and any family members present at the time of the visit.  Carrillo Patel M.D., Glaucoma, February 28, 2023

## 2023-02-28 NOTE — PATIENT INSTRUCTIONS
Continue prednisolone eyedrop to 3 times daily left eye   Continue latanoprost (Green) once daily at night in each eye   Stop Timolol (Yellow)  Cosopt (Timolol/Dorzolamide) (Blue) which is a blue top- apply 1 drop twice a day in each eye    Continue Brimonidine (Purple) 3 times per day in each eye

## 2023-02-28 NOTE — NURSING NOTE
Chief Complaints and History of Present Illnesses   Patient presents with     Post Op (Ophthalmology) Left Eye     Chief Complaint(s) and History of Present Illness(es)     Post Op (Ophthalmology) Left Eye            Laterality: left eye    Onset: gradual    Onset: days ago    Associated symptoms: eye pain, flashes and floaters (pt sees red dots in his LE.)    Pain scale: 5/10          Comments    Pt notes stable vision since last visit.     Current drops patient reports he is taking:    Left eye post-op medications:      latanoprost in left eye.     timolol once daily in left eye.     Taking brimonidine once daily left eye.     Prednisolone eyedrop 4 times per day in left eye.       current medications in right eye:     Latanoprost at bedtime left eye.     Timolol BID left eye.    Brimonidine TID left eye.    TORSTEN PEREIRA COA February 28, 2023 10:46 AM

## 2023-03-06 ENCOUNTER — TELEPHONE (OUTPATIENT)
Dept: OPHTHALMOLOGY | Facility: CLINIC | Age: 63
End: 2023-03-06
Payer: COMMERCIAL

## 2023-03-06 ENCOUNTER — NURSE TRIAGE (OUTPATIENT)
Dept: NURSING | Facility: CLINIC | Age: 63
End: 2023-03-06

## 2023-03-06 NOTE — TELEPHONE ENCOUNTER
I consulted with the patient's provider who would like the patient seen today if he can get a ride to clinic.   I spoke to the patient's cousin who is his ride.  She cannot bring him to clinic today but she can tomorrow morning.  Appointment scheduled for tomorrow morning.

## 2023-03-06 NOTE — TELEPHONE ENCOUNTER
I spoke to the patient who notes he has new flashes of light for two days.   He notes he has foggy vision since starting the new drops (Prednisolone),  Patient would like a return phone call and a call to his cousin Keya who provides him with a ride to appointments.

## 2023-03-06 NOTE — TELEPHONE ENCOUNTER
Caller reporting the following red-flag symptom(s): Sudden vision loss    Per the system red-flag symptom policy, patient was instructed to:  speak with a Registered Nurse    Action:  Patient warm transferred to a Registered NurseCarlene

## 2023-03-06 NOTE — TELEPHONE ENCOUNTER
Nurse Triage SBAR    Is this a 2nd Level Triage? Yes    Situation:  Vision Changes    Background/Assessment:    Pt saw the eye doctor 2/28/2023 for vision changes.   Was placed on eye drop medication for [place] and increased eye pressure.    Pt reporting, having increased vision changes.   Can not see out of the right eye at all now.   AND the left eye is very cloudy.      Seeing a lot of floaters and flashes of light.    I called the back line to the clinic for further assistance for Pt care.    Rebekah Guerrero RN  Central Triage Red Flags/Med Refills      Protocol Recommended Disposition:   See in Office Today      Reason for Disposition    Patient wants to be seen    Additional Information    Negative: Weakness of the face, arm or leg on one side of the body    Negative: Followed getting substance in the eye    Negative: Foreign body or object is or was lodged in the eye    Negative: Followed an eye injury    Negative: Followed sun lamp or sun exposure (UV keratitis)    Negative: Yellow or green discharge (pus) in the eye    Negative: Pregnant    Negative: Complete loss of vision in 1 or both eyes    Negative: SEVERE eye pain    Negative: Severe headache    Negative: Double vision    Negative: Blurred vision or visual changes and present now and sudden onset or new (e.g., minutes, hours, days)  (Exception: Seeing floaters / black specks OR previously diagnosed migraine headaches with same symptoms.)    Negative: Patient sounds very sick or weak to the triager    Negative: Flashes of light  (Exception: brief from pressing on the eyeball)    Negative: Eye pain and brief (now gone) blurred vision or visual changes    Negative: Taking digoxin (e.g., Lanoxin, Digitek, Cardoxin, Lanoxicaps; Toloxin in [place]) and blurred vision, yellow vision, or yellow-green halos    Negative: Many floaters in the eye    Negative: Jaw pain while eating and age > 50 years    Negative: Headache and age > 50 years    Protocols used: VISION  LOSS OR CHANGE-A-OH

## 2023-03-07 ENCOUNTER — OFFICE VISIT (OUTPATIENT)
Dept: OPHTHALMOLOGY | Facility: CLINIC | Age: 63
End: 2023-03-07
Attending: OPHTHALMOLOGY
Payer: COMMERCIAL

## 2023-03-07 DIAGNOSIS — Z98.890 POSTOPERATIVE EYE STATE: Primary | ICD-10-CM

## 2023-03-07 PROCEDURE — G0463 HOSPITAL OUTPT CLINIC VISIT: HCPCS

## 2023-03-07 PROCEDURE — 99024 POSTOP FOLLOW-UP VISIT: CPT | Performed by: OPHTHALMOLOGY

## 2023-03-07 PROCEDURE — 92134 CPTRZ OPH DX IMG PST SGM RTA: CPT | Performed by: OPHTHALMOLOGY

## 2023-03-07 ASSESSMENT — TONOMETRY
OD_IOP_MMHG: UNABLE
OS_IOP_MMHG: 13
OS_IOP_MMHG: 15
OD_IOP_MMHG: 03
IOP_METHOD: TONOPEN
IOP_METHOD: ICARE
IOP_METHOD: APPLANATION
OD_IOP_MMHG: 1

## 2023-03-07 ASSESSMENT — CONF VISUAL FIELD
OS_SUPERIOR_TEMPORAL_RESTRICTION: 3
OD_SUPERIOR_NASAL_RESTRICTION: 1
OD_INFERIOR_NASAL_RESTRICTION: 1
OD_SUPERIOR_TEMPORAL_RESTRICTION: 1
OS_INFERIOR_TEMPORAL_RESTRICTION: 1
OD_INFERIOR_TEMPORAL_RESTRICTION: 1
OS_SUPERIOR_NASAL_RESTRICTION: 1
OS_INFERIOR_NASAL_RESTRICTION: 1

## 2023-03-07 ASSESSMENT — SLIT LAMP EXAM - LIDS
COMMENTS: NORMAL
COMMENTS: NORMAL

## 2023-03-07 ASSESSMENT — VISUAL ACUITY
OS_PH_SC: 20/40
OS_PH_SC+: -1
OD_SC: NLP
OS_SC+: -2
OS_SC: 20/60
METHOD: SNELLEN - LINEAR

## 2023-03-07 ASSESSMENT — EXTERNAL EXAM - RIGHT EYE: OD_EXAM: NORMAL

## 2023-03-07 ASSESSMENT — CUP TO DISC RATIO: OS_RATIO: 0.85

## 2023-03-07 ASSESSMENT — EXTERNAL EXAM - LEFT EYE: OS_EXAM: NORMAL

## 2023-03-07 NOTE — NURSING NOTE
Chief Complaints and History of Present Illnesses   Patient presents with     Post Op (Ophthalmology) Left Eye     Chief Complaint(s) and History of Present Illness(es)     Post Op (Ophthalmology) Left Eye            Laterality: left eye    Onset: 1 week ago          Comments    s/p left BGI 12/9/22 LE-Pt. States that 2 days ago he was watching tv and noticed a large blue spot that came across vision. Unable to see through this spot. Occasional pain LE. Has been seeing some red lights in vision as well.   Monica Juarez COT 8:32 AM March 7, 2023

## 2023-03-07 NOTE — PROGRESS NOTES
Chief Complaint/Presenting Concern: severe POAG each eye, referred by Dr. Kurtz, s/p Left BGI 12/9/22.     History of Present Illness:   Parvez Vásquez is a 61 year old patient who presents for evaluation of glaucoma.  Referred by Dr. Kurtz for evaluation of severe glaucoma in both eyes. Patient reports that he was diagnosed with glaucoma in both eyes about 15 years ago. Patient has a complicated history of glaucoma and cataract surgeries in the right eye and now has NLP vision in the right eye. left eye has been progressing over the years but patient is hesitant to have surgery in the left eye due to poor outcome after surgery in the right eye.     3/7/23: s/p left BGI 12/9/22, last visit 2/28/23. He presents today for evaluation of new flashing lights. He says he didn't feel right on 3/2/23 and his home nurse checked his blood pressure and found his blood pressure to be extremely high (systolic 226) so he went to the [facility] ER. He also was having some blurry vision at that time. Ophthalmology was consulted over the phone and recommended outpatient follow up. He saw his PCP yesterday who started him on a new blood pressure medication. He notes that yesterday he started seeing a flash in his left eye a couple times and he saw it once today. He describes it as a blue flash of light. He notes he has felt very anxious lately and has gained a lot of weight. He feels like his vision has a film over it and it makes him uncomfortable.     During the interview he saw a blue light inferiorly in his left eye vision for about 3 seconds and then it moved and went away.     Current drops:    Left eye post-op medications:    o Latanoprost nightly in left eye.   o Cosopt BID left eye.   o Taking brimonidine TID left eye.   o Prednisolone eyedrop 3 times per day in left eye.     current medications in right eye:   o Latanoprost at bedtime right eye.   o Cosopt BID left eye.  o Brimonidine TID right eye.    Relevant Past  Medical/Family/Social History:  PMH: Non ischemic cardiopmyopathy, htn, AVNRT s/p debrillator  FH: non contributory     Relevant Review of Systems: none     Diagnosis: Primary open angle glaucoma  severe left eye   Year diagnosis: 2007  Previous glaucoma surgery/laser:  - Ahmed right eye (2011, [facility])  - CEIOL, lysis of iridocorneal adhesions, IFIS, - trabeculectomy/[facility] right eye (2012, [facility])  - BGI 12/9/22 left eye  Maximum intraocular pressure: unknown/32 mmHg   Family history: negative  CCT: 55/3 /523  Gonio: open to scleral spur 360;   Refractive status: low myope  Trauma history: negative  Steroid exposure: positive  Vasospastic disease: Migrane/Raynaud phenomenon: negative  A past hemodynamic crisis or Low BP: negative  Meds AEs/intolerance: unknown   PMHx: cardiomyopathy   Anticoagulants: none    - Visual field August 11, 2022  - Right eye - unable  - Left eye - generalized depression high FP/FN  - OCT Macula August 11, 2022  - Right Eye: unable  - Left Eye: no subretinal or intraretinal fluid, complete PVD  - OCT Optic Nerve RNFL Spectralis August 11, 2022  - Right Eye: unable  - Left Eye: Diffuse RNFL thinning, floor effect    Today's testing:   - IOP:1/13 mmHg   OCT macula 3/7/23 left eye: normal retinal contour and thickness, posterior hyaloid detached but visible, no edema    Additional Ocular History:     2. Cataract, left eye    3. PCIOL right eye, sulcus lens  - surgery complicated by IFIS and iris prolapse    4. NLP right eye   Total RD     Plan/Recommendations:    Discussed findings with patient.    Patient with advanced glaucoma left eye with uncontrolled IOP requiring oral Diamox with evidence of progression over time, now status post left BGI surgery 12/9/22. Avoided cataract surgery at time of glaucoma surgery given history of complication in the right eye, plan for cataract surgery after IOP is well controlled.    POW 12 s/p left BGI 12/9/22, tube appears opened with bleb, IOP elevated likely  hypertensive phase.    Continue latanoprost once daily at night each eye    Continue Cosopt BID each eye    Continue brimonidine TID each eye    Continue Prednisolone BID    Decline in vision could be due to worsening cataract    Retinal exam reassuring no tears, attached, instructed to return if develops and flashes or new floaters. Precautions given to the patient.     Post-op return precautions reviewed.    Plan for cataract surgery in 2 months     Candy Jenkins MD  Ophthalmology Resident, PGY-3  [facility]      Physician Attestation     Attending Physician Attestation:  Complete documentation of historical and exam elements from today's encounter can be found in the full encounter summary report (not reduplicated in this progress note). I personally obtained the chief complaint(s) and history of present illness. I confirmed and edited as necessary the review of systems, past medical/surgical history, family history, social history, and examination findings as documented by others; and I examined the patient myself. I personally reviewed the relevant tests, images, and reports as documented above. I personally reviewed the ophthalmic test(s) associated with this encounter, agree with the interpretation(s) as documented by the resident/fellow and have edited the corresponding report(s) as necessary. I formulated and edited as necessary the assessment and plan and discussed the findings and management plan with the patient and any family members present at the time of the visit.  Carrillo Patel M.D., Glaucoma, March 7, 2023

## 2023-04-11 ENCOUNTER — OFFICE VISIT (OUTPATIENT)
Dept: OPHTHALMOLOGY | Facility: CLINIC | Age: 63
End: 2023-04-11
Attending: OPHTHALMOLOGY
Payer: COMMERCIAL

## 2023-04-11 DIAGNOSIS — H25.12 AGE-RELATED NUCLEAR CATARACT OF LEFT EYE: ICD-10-CM

## 2023-04-11 DIAGNOSIS — H40.1133 PRIMARY OPEN ANGLE GLAUCOMA (POAG) OF BOTH EYES, SEVERE STAGE: Primary | ICD-10-CM

## 2023-04-11 PROCEDURE — G0463 HOSPITAL OUTPT CLINIC VISIT: HCPCS | Performed by: OPHTHALMOLOGY

## 2023-04-11 PROCEDURE — 99213 OFFICE O/P EST LOW 20 MIN: CPT | Mod: GC | Performed by: OPHTHALMOLOGY

## 2023-04-11 ASSESSMENT — CONF VISUAL FIELD
OS_SUPERIOR_NASAL_RESTRICTION: 1
OS_INFERIOR_NASAL_RESTRICTION: 1
OD_SUPERIOR_NASAL_RESTRICTION: 1
OD_INFERIOR_TEMPORAL_RESTRICTION: 1
METHOD: COUNTING FINGERS
OD_SUPERIOR_TEMPORAL_RESTRICTION: 1
OS_INFERIOR_TEMPORAL_RESTRICTION: 3
OD_INFERIOR_NASAL_RESTRICTION: 1
OS_SUPERIOR_TEMPORAL_RESTRICTION: 1

## 2023-04-11 ASSESSMENT — EXTERNAL EXAM - RIGHT EYE: OD_EXAM: NORMAL

## 2023-04-11 ASSESSMENT — SLIT LAMP EXAM - LIDS
COMMENTS: NORMAL
COMMENTS: NORMAL

## 2023-04-11 ASSESSMENT — VISUAL ACUITY
OD_SC: NLP
OS_PH_SC: 20/60
OS_SC: 20/125
OS_PH_SC+: -2
METHOD: SNELLEN - LINEAR

## 2023-04-11 ASSESSMENT — REFRACTION_WEARINGRX
SPECS_TYPE: BIFOCAL
OS_AXIS: 165
OS_ADD: +3.00
OS_SPHERE: -3.00
OS_CYLINDER: +3.00
OD_SPHERE: BALANCE

## 2023-04-11 ASSESSMENT — CUP TO DISC RATIO: OS_RATIO: 0.85

## 2023-04-11 ASSESSMENT — EXTERNAL EXAM - LEFT EYE: OS_EXAM: NORMAL

## 2023-04-11 ASSESSMENT — TONOMETRY
IOP_METHOD: APPLANATION
OS_IOP_MMHG: 16
OD_IOP_MMHG: 03

## 2023-04-11 NOTE — PROGRESS NOTES
Chief Complaint/Presenting Concern: severe POAG each eye, s/p Left BGI 12/9/22.     History of Present Illness:   Parvez Vásquez is a 61 year old patient who presents for evaluation of glaucoma.  Referred by Dr. Kurtz for evaluation of severe glaucoma in both eyes. Patient reports that he was diagnosed with glaucoma in both eyes about 15 years ago. Patient has a complicated history of glaucoma and cataract surgeries in the right eye and now has NLP vision in the right eye. left eye has been progressing over the years but patient is hesitant to have surgery in the left eye due to poor outcome after surgery in the right eye.     4/11/23: POM4 s/p left BGI 12/9/22, last visit 2/28/23.    Current drops:    Left eye post-op medications:    o Latanoprost nightly in left eye.   o Cosopt BID left eye.   o Taking brimonidine TID left eye.   o Prednisolone eyedrop 3 times per day in left eye.     current medications in right eye:   o Latanoprost at bedtime right eye.   o Cosopt BID left eye.  o Brimonidine TID right eye.    Relevant Past Medical/Family/Social History:  PMH: Non ischemic cardiopmyopathy, htn, AVNRT s/p debrillator  FH: non contributory     Relevant Review of Systems: none     Diagnosis: Primary open angle glaucoma  severe left eye   Year diagnosis: 2007  Previous glaucoma surgery/laser:  - Ahmed right eye (2011, [facility])  - CEIOL, lysis of iridocorneal adhesions, IFIS, - trabeculectomy/[facility] right eye (2012, [facility])  - BGI 12/9/22 left eye  Maximum intraocular pressure: unknown/32 mmHg   Family history: negative  CCT: 55/3 /523  Gonio: open to scleral spur 360;   Refractive status: low myope  Trauma history: negative  Steroid exposure: positive  Vasospastic disease: Migrane/Raynaud phenomenon: negative  A past hemodynamic crisis or Low BP: negative  Meds AEs/intolerance: unknown   PMHx: cardiomyopathy   Anticoagulants: none    - Visual field August 11, 2022  - Right eye - unable  - Left eye - generalized  depression high FP/FN  - OCT Macula August 11, 2022  - Right Eye: unable  - Left Eye: no subretinal or intraretinal fluid, complete PVD  - OCT Optic Nerve RNFL Spectralis August 11, 2022  - Right Eye: unable  - Left Eye: Diffuse RNFL thinning, floor effect    Today's testing:   - IOP:3/16 mmHg   OCT macula 3/7/23 left eye: normal retinal contour and thickness, posterior hyaloid detached but visible, no edema    Additional Ocular History:     2. Cataract, left eye    3. PCIOL right eye, sulcus lens  - surgery complicated by IFIS and iris prolapse    4. NLP right eye   Total RD     Plan/Recommendations:    Discussed findings with patient.    Patient with advanced glaucoma left eye with uncontrolled IOP requiring oral Diamox with evidence of progression over time, now status post left BGI surgery 12/9/22. Avoided cataract surgery at time of glaucoma surgery given history of complication in the right eye, plan for cataract surgery after IOP is well controlled.    POM4 s/p left BGI 12/9/22, tube appears opened with bleb, IOP elevated likely hypertensive phase.    Continue latanoprost once daily at night each eye    Continue Cosopt BID each eye    Continue brimonidine TID each eye    Continue Prednisolone once daily for 1 week then stop    Decline in vision could be due to worsening cataract, patient wants to wait on cataract surgery for now.     Plan for cataract surgery in 2 months     Candy Jenkins MD  Ophthalmology Resident, PGY-3  [facility]        Physician Attestation     Attending Physician Attestation:  Complete documentation of historical and exam elements from today's encounter can be found in the full encounter summary report (not reduplicated in this progress note). I personally obtained the chief complaint(s) and history of present illness. I confirmed and edited as necessary the review of systems, past medical/surgical history, family history, social history, and examination findings as  documented by others; and I examined the patient myself. I personally reviewed the relevant tests, images, and reports as documented above. I formulated and edited as necessary the assessment and plan and discussed the findings and management plan with the patient and any family members present at the time of the visit.  Carrillo Patel M.D., Glaucoma, April 11, 2023

## 2023-04-11 NOTE — NURSING NOTE
Chief Complaints and History of Present Illnesses   Patient presents with     Primary Open Angle Glaucoma Follow Up     1 month follow up both eyes     Chief Complaint(s) and History of Present Illness(es)     Primary Open Angle Glaucoma Follow Up            Comments: 1 month follow up both eyes          Comments    Pt states vision is getting worse in LE. No eye pain today, but pt reports some burning in each eye at times.  No new flashes or floaters. Pt seeing red flashing lights in LE that come and go, last seen a few days ago.    LUCERO Todd April 11, 2023 10:36 AM

## 2023-04-19 RX ORDER — LATANOPROST 50 UG/ML
SOLUTION/ DROPS OPHTHALMIC
Qty: 7.5 ML | OUTPATIENT
Start: 2023-04-19

## 2023-05-03 RX ORDER — LATANOPROST 50 UG/ML
SOLUTION/ DROPS OPHTHALMIC
Qty: 7.5 ML | OUTPATIENT
Start: 2023-05-03

## 2023-06-27 ENCOUNTER — OFFICE VISIT (OUTPATIENT)
Dept: OPHTHALMOLOGY | Facility: CLINIC | Age: 63
End: 2023-06-27
Attending: OPHTHALMOLOGY
Payer: COMMERCIAL

## 2023-06-27 DIAGNOSIS — H40.1133 PRIMARY OPEN ANGLE GLAUCOMA (POAG) OF BOTH EYES, SEVERE STAGE: Primary | ICD-10-CM

## 2023-06-27 PROCEDURE — 99213 OFFICE O/P EST LOW 20 MIN: CPT | Performed by: OPHTHALMOLOGY

## 2023-06-27 PROCEDURE — G0463 HOSPITAL OUTPT CLINIC VISIT: HCPCS | Performed by: OPHTHALMOLOGY

## 2023-06-27 ASSESSMENT — TONOMETRY
IOP_METHOD: APPLANATION
OS_IOP_MMHG: 13

## 2023-06-27 ASSESSMENT — CONF VISUAL FIELD
OS_SUPERIOR_TEMPORAL_RESTRICTION: 3
OD_INFERIOR_TEMPORAL_RESTRICTION: 1
OD_SUPERIOR_TEMPORAL_RESTRICTION: 1
OD_INFERIOR_NASAL_RESTRICTION: 1
OS_SUPERIOR_NASAL_RESTRICTION: 1
OS_INFERIOR_NASAL_RESTRICTION: 1
OS_INFERIOR_TEMPORAL_RESTRICTION: 1
OD_SUPERIOR_NASAL_RESTRICTION: 1

## 2023-06-27 ASSESSMENT — CUP TO DISC RATIO: OS_RATIO: 0.85

## 2023-06-27 ASSESSMENT — SLIT LAMP EXAM - LIDS
COMMENTS: NORMAL
COMMENTS: NORMAL

## 2023-06-27 ASSESSMENT — EXTERNAL EXAM - LEFT EYE: OS_EXAM: NORMAL

## 2023-06-27 ASSESSMENT — VISUAL ACUITY
OS_SC: 20/125
METHOD: SNELLEN - LINEAR
OD_SC: NLP
OS_PH_SC: 20/60

## 2023-06-27 ASSESSMENT — EXTERNAL EXAM - RIGHT EYE: OD_EXAM: NORMAL

## 2023-06-27 NOTE — NURSING NOTE
"Chief Complaints and History of Present Illnesses   Patient presents with     Follow Up     Primary open angle glaucoma (POAG) of both eyes  s/p Left BGI 12/9/22.     Chief Complaint(s) and History of Present Illness(es)     Follow Up            Comments: Primary open angle glaucoma (POAG) of both eyes  s/p Left BGI 12/9/22.          Comments    St states his sight is getting worse  C/o eye pain 5-6/10 on the pain scale left eye   Pt was in the ED 6/21/23 with an abrasion in the left eye   No flashes or floaters  Using:  latanoprost once daily at night each eye  Cosopt BID each eye   brimonidine TID each eye ** pt stopped using 1 month ago \" I didn't know I was supposed to take it\"    Ruby Drew COT 10:13 AM June 27, 2023                               "

## 2023-06-27 NOTE — PROGRESS NOTES
Chief Complaint/Presenting Concern: severe POAG each eye, s/p Left BGI 12/9/22.     History of Present Illness:   Parvez Vásquez is a 61 year old patient who presents for evaluation of glaucoma.  Referred by Dr. Kurtz for evaluation of severe glaucoma in both eyes. Patient reports that he was diagnosed with glaucoma in both eyes about 15 years ago. Patient has a complicated history of glaucoma and cataract surgeries in the right eye and now has NLP vision in the right eye. left eye has been progressing over the years but patient is hesitant to have surgery in the left eye due to poor outcome after surgery in the right eye.     Today: POM6 s/p left BGI 12/9/22, reports vision is still blurry left eye.     Current drops:    Left eye post-op medications:    o Latanoprost nightly in left eye.   o Cosopt BID left eye.   o Taking brimonidine TID left eye.   o Prednisolone eyedrop 3 times per day in left eye.     current medications in right eye:   o Latanoprost at bedtime right eye.   o Cosopt BID left eye.  o Brimonidine TID right eye.    Relevant Past Medical/Family/Social History:  PMH: Non ischemic cardiopmyopathy, htn, AVNRT s/p debrillator  FH: non contributory     Relevant Review of Systems: none     Diagnosis: Primary open angle glaucoma  severe left eye   Year diagnosis: 2007  Previous glaucoma surgery/laser:  - Ahmed right eye (2011, [facility])  - CEIOL, lysis of iridocorneal adhesions, IFIS, - trabeculectomy/[facility] right eye (2012, [facility])  - BGI 12/9/22 left eye  Maximum intraocular pressure: unknown/32 mmHg   Family history: negative  CCT: 55/3 /523  Gonio: open to scleral spur 360;   Refractive status: low myope  Trauma history: negative  Steroid exposure: positive  Vasospastic disease: Migrane/Raynaud phenomenon: negative  A past hemodynamic crisis or Low BP: negative  Meds AEs/intolerance: unknown   PMHx: cardiomyopathy   Anticoagulants: none    - Visual field August 11, 2022  - Right eye - unable  - Left eye  - generalized depression high FP/FN  - OCT Macula August 11, 2022  - Right Eye: unable  - Left Eye: no subretinal or intraretinal fluid, complete PVD  - OCT Optic Nerve RNFL Spectralis August 11, 2022  - Right Eye: unable  - Left Eye: Diffuse RNFL thinning, floor effect    Today's testing:   - IOP: 13 mmHg left eye   OCT macula 3/7/23 left eye: normal retinal contour and thickness, posterior hyaloid detached but visible, no edema    Additional Ocular History:     2. Cataract, left eye    3. PCIOL right eye, sulcus lens  - surgery complicated by IFIS and iris prolapse    4. NLP right eye   Total RD     Plan/Recommendations:    Discussed findings with patient.    Patient with advanced glaucoma left eye with uncontrolled IOP requiring oral Diamox with evidence of progression over time, now status post left BGI surgery 12/9/22. Avoided cataract surgery at time of glaucoma surgery given history of complication in the right eye, plan for cataract surgery after IOP is well controlled.    POM6 s/p left BGI 12/9/22, tube appears opened with bleb    Continue latanoprost once daily at night each eye    Continue Cosopt BID each eye    Continue brimonidine TID each eye    Decline in vision could be due to worsening cataract, patient wants to wait on cataract surgery for now.     RTC in 3-4 months [facility], IOP       Physician Attestation     Attending Physician Attestation:  Complete documentation of historical and exam elements from today's encounter can be found in the full encounter summary report (not reduplicated in this progress note). I personally obtained the chief complaint(s) and history of present illness. I confirmed and edited as necessary the review of systems, past medical/surgical history, family history, social history, and examination findings as documented by others; and I examined the patient myself. I personally reviewed the relevant tests, images, and reports as documented above. I formulated and edited as  necessary the assessment and plan and discussed the findings and management plan with the patient and any family members present at the time of the visit.  Carrillo Patel M.D., Glaucoma, June 27, 2023

## 2023-08-23 ENCOUNTER — TELEPHONE (OUTPATIENT)
Dept: OPHTHALMOLOGY | Facility: CLINIC | Age: 63
End: 2023-08-23
Payer: COMMERCIAL

## 2023-08-23 NOTE — TELEPHONE ENCOUNTER
Called and updated Keya in regards to the upcoming appointment for Parvez in Sept with Dr. Jorge Nava Communication Facilitator on 8/23/2023 at 11:00 AM

## 2023-08-23 NOTE — TELEPHONE ENCOUNTER
[facility] Call Center    Phone Message    May a detailed message be left on voicemail: yes     Reason for Call: Other: pt requesting a call back regarding a medication eye drops pt states his eyes are bothering him. Pt is unable to give writer the medication name due to blindness and can not read the label. Please contact pt to discuss eye drops. Thank you!      Action Taken: Message routed to:  [facility] ([facility] eye    Travel Screening: Not Applicable

## 2023-08-25 ENCOUNTER — TELEPHONE (OUTPATIENT)
Dept: FAMILY MEDICINE | Facility: CLINIC | Age: 63
End: 2023-08-25
Payer: COMMERCIAL

## 2023-08-25 ENCOUNTER — OFFICE VISIT (OUTPATIENT)
Dept: OPHTHALMOLOGY | Facility: CLINIC | Age: 63
End: 2023-08-25
Attending: OPHTHALMOLOGY
Payer: COMMERCIAL

## 2023-08-25 DIAGNOSIS — H04.123 BILATERAL DRY EYES: ICD-10-CM

## 2023-08-25 DIAGNOSIS — H40.1133 PRIMARY OPEN ANGLE GLAUCOMA (POAG) OF BOTH EYES, SEVERE STAGE: Primary | ICD-10-CM

## 2023-08-25 PROCEDURE — G0463 HOSPITAL OUTPT CLINIC VISIT: HCPCS

## 2023-08-25 PROCEDURE — 99213 OFFICE O/P EST LOW 20 MIN: CPT | Mod: GC | Performed by: STUDENT IN AN ORGANIZED HEALTH CARE EDUCATION/TRAINING PROGRAM

## 2023-08-25 ASSESSMENT — CONF VISUAL FIELD
OS_SUPERIOR_NASAL_RESTRICTION: 1
OD_INFERIOR_NASAL_RESTRICTION: 1
OS_INFERIOR_NASAL_RESTRICTION: 1
METHOD: COUNTING FINGERS
OD_SUPERIOR_TEMPORAL_RESTRICTION: 1
OS_INFERIOR_TEMPORAL_RESTRICTION: 1
OD_INFERIOR_TEMPORAL_RESTRICTION: 1
OD_SUPERIOR_NASAL_RESTRICTION: 1
OS_SUPERIOR_TEMPORAL_RESTRICTION: 3

## 2023-08-25 ASSESSMENT — TONOMETRY
OD_IOP_MMHG: 8
OS_IOP_MMHG: 17
IOP_METHOD: APPLANATION
OS_IOP_MMHG: 26
IOP_METHOD: TONOPEN

## 2023-08-25 ASSESSMENT — VISUAL ACUITY
METHOD: SNELLEN - LINEAR
OS_PH_SC: 20/60
OS_PH_SC+: -2
OD_SC: NLP
OS_SC: 20/150

## 2023-08-25 ASSESSMENT — EXTERNAL EXAM - RIGHT EYE: OD_EXAM: NORMAL

## 2023-08-25 ASSESSMENT — EXTERNAL EXAM - LEFT EYE: OS_EXAM: NORMAL

## 2023-08-25 ASSESSMENT — CUP TO DISC RATIO: OS_RATIO: 0.85

## 2023-08-25 NOTE — LETTER
8/25/2023       RE: Parvez Vásquez  [address]  [place] [address]     Dear Colleague,    Thank you for referring your patient, Parvez Vásquez, to the [facility] at [facility]. Please see a copy of my visit note below.    Ophthalmology Acute Clinic     Chief Complaint(s) and History of Present Illness(es)       Eye Pain Left Eye           HPI    Patient states that he has a hard time making things out with his Le. He states that his vision is blurry in the LE. Patient states that he has pain around his LE. No new flashes, floaters, or curtain down visual field.  Ocular Meds: Has not been taking his drops like he should be, using every other day  latanoprost   Cosopt   brimonidine   Marlon GREENWOOD, August 25, 2023 10:36 AM        Last edited by Nydia Rebolledo MD on 8/27/2023  2:53 PM.            HPI:   Parvez Vásquez is a 62 year old male who presents for left eye blurry vision and periorbital pain. The blurry vision started months ago and has not acutely worsened. However, 2 weeks ago he started to notice pain in his left eye that will last a couple of seconds. When it happens it is an aching pain that is in his eye and also radiates to his temple. He has not been taking his glaucoma medication. His last time taking the drops was 3 days ago and he has been very inconsistent taking them. Pain seems to happen more when he's not taking them.     Past Ocular history:     - Ocular Surgical History:   - Ahmed right eye (2011, [facility])  - CEIOL, lysis of iridocorneal adhesions, IFIS, - trabeculectomy/[facility] right eye (2012, [facility])  - BGI 12/9/22 left eye  Left eye post-op medications:    Latanoprost nightly in left eye.   Cosopt BID left eye.   Taking brimonidine TID left eye.   Prednisolone eyedrop 3 times per day in left eye.   Current medications in right eye:   Latanoprost at bedtime right eye.   Cosopt BID left  eye.  Brimonidine TID right eye.    PMH:   Past Medical History:   Diagnosis Date     Gastroesophageal reflux disease without esophagitis      Gout      HLD (hyperlipidemia)      HTN (hypertension)      ICD (implantable cardioverter-defibrillator) in place      SHELLIE (obstructive sleep apnea)      PAD (peripheral artery disease) (H)      Primary open angle glaucoma (POAG) of both eyes      Urinary retention      FH: Glaucoma    Review of systems for the eyes was negative other than the pertinent positives/negatives listed in the HPI.      Assessment & Plan      Parvez Vásquez is a 62 year old male with the following diagnoses:     Dry Eyes OU  Most consistent with surface keratopathy. He has not been taking his glaucoma medication and his IOP was elevated to 26. He currently does not have pain. His pain is transient, lasts for seconds, and worse in the mornings. Likely secondary to his surface keratopathy. He is currently medication non-adherent however with IOP 26 on applanation it is unlikely his pressure causing the pain.   -Restart PFAT at least QID and prn OU  -Start artificial tear gel or ointment at bedtime OU    POAG, severe OU  - IOP elevated today and not at target  - Discussed importance of medication compliance in preservation of remaining optic nerve and patient verbalizes understanding and will restart his medication.  -Round of glaucoma drops given in clinic  -Return to previous regimen, patient has enough eye drops:  Continue latanoprost once daily at night each eye  Continue Cosopt BID each eye  Continue brimonidine TID each eye  -Continue regularly scheduled follow up with Dr. Patel    Post-operative care  - Discussed discontinuation of prednisolone therapy. Bleb healing well and no anterior or posterior inflammation.  -Stop PF    Counseled return/RD precautions    Patient disposition:   September 26 with Dr. Patel, or sooner changes    Patient seen with Dr. Rebolledo    Thank you for  entrusting us with your care  Alysia Bailon MD, PGY3  Ophthalmology Resident  [facility]    Attending Physician Attestation:  Complete documentation of historical and exam elements from today's encounter can be found in the full encounter summary report (not reduplicated in this progress note).  I personally obtained the chief complaint(s) and history of present illness.  I confirmed and edited as necessary the review of systems, past medical/surgical history, family history, social history, and examination findings as documented by others; and I examined the patient myself.  I personally reviewed the relevant tests, images, and reports as documented above.  I formulated and edited as necessary the assessment and plan and discussed the findings and management plan with the patient and family. . - Nydia Rebolledo MD          Again, thank you for allowing me to participate in the care of your patient.      Sincerely,    Levar Catalan MD       Abdomen soft, nontender, nondistended, bowel sounds present in all 4 quadrants.

## 2023-08-25 NOTE — TELEPHONE ENCOUNTER
Walk in pt today (pt/family stated was contacted to come in today/no notation in epic).    Pt with left eye vision loss and pain around eye/headache pain    Monocular pt and right eye with no light perception vision by history.    Pt with very limited visual field by history in left eye.    Pt was in ED 8-9-2023 per [facility] and was to f/U with ophthalmology.    Glaucoma team aware pt here and not able to see in clinic today    Dr. Catalan in acute clinic agrees to see pt today and pt scheduled.    Neil Frazier RN 10:30 AM 08/25/23      
Patient

## 2023-08-25 NOTE — NURSING NOTE
Chief Complaints and History of Present Illnesses   Patient presents with    Eye Pain Left Eye     Chief Complaint(s) and History of Present Illness(es)       Eye Pain Left Eye               Comments    Patient states that he has a hard time making things out with his Le. He states that his vision is blurry in the LE. Patient states that he has pain around his LE. He states that he sees a red flash of light in the LE that comes and goes. No floaters.     Ocular Meds: Has not been taking his drops like he should be, using every other day  latanoprost   Cosopt   brimonidine   Marlon GREENWOOD, August 25, 2023 10:36 AM

## 2023-08-25 NOTE — PROGRESS NOTES
Ophthalmology Acute Clinic     Chief Complaint(s) and History of Present Illness(es)       Eye Pain Left Eye           HPI    Patient states that he has a hard time making things out with his Le. He states that his vision is blurry in the LE. Patient states that he has pain around his LE. No new flashes, floaters, or curtain down visual field.  Ocular Meds: Has not been taking his drops like he should be, using every other day  latanoprost   Cosopt   brimonidine   Maroln Liz COT, August 25, 2023 10:36 AM        Last edited by Nydia Rebolledo MD on 8/27/2023  2:53 PM.            HPI:   Parvez Vásquez is a 62 year old male who presents for left eye blurry vision and periorbital pain. The blurry vision started months ago and has not acutely worsened. However, 2 weeks ago he started to notice pain in his left eye that will last a couple of seconds. When it happens it is an aching pain that is in his eye and also radiates to his temple. He has not been taking his glaucoma medication. His last time taking the drops was 3 days ago and he has been very inconsistent taking them. Pain seems to happen more when he's not taking them.     Past Ocular history:     - Ocular Surgical History:   - Ahmed right eye (2011, [facility])  - CEIOL, lysis of iridocorneal adhesions, IFIS, - trabeculectomy/[facility] right eye (2012, [facility])  - BGI 12/9/22 left eye  Left eye post-op medications:    Latanoprost nightly in left eye.   Cosopt BID left eye.   Taking brimonidine TID left eye.   Prednisolone eyedrop 3 times per day in left eye.   Current medications in right eye:   Latanoprost at bedtime right eye.   Cosopt BID left eye.  Brimonidine TID right eye.    PMH:   Past Medical History:   Diagnosis Date    Gastroesophageal reflux disease without esophagitis     Gout     HLD (hyperlipidemia)     HTN (hypertension)     ICD (implantable cardioverter-defibrillator) in place     SHELLIE (obstructive sleep apnea)     PAD (peripheral artery disease) (H)      Primary open angle glaucoma (POAG) of both eyes     Urinary retention      FH: Glaucoma    Review of systems for the eyes was negative other than the pertinent positives/negatives listed in the HPI.      Assessment & Plan      Parvez Vásquez is a 62 year old male with the following diagnoses:     Dry Eyes OU  Most consistent with surface keratopathy. He has not been taking his glaucoma medication and his IOP was elevated to 26. He currently does not have pain. His pain is transient, lasts for seconds, and worse in the mornings. Likely secondary to his surface keratopathy. He is currently medication non-adherent however with IOP 26 on applanation it is unlikely his pressure causing the pain.   -Restart PFAT at least QID and prn OU  -Start artificial tear gel or ointment at bedtime OU    POAG, severe OU  - IOP elevated today and not at target  - Discussed importance of medication compliance in preservation of remaining optic nerve and patient verbalizes understanding and will restart his medication.  -Round of glaucoma drops given in clinic  -Return to previous regimen, patient has enough eye drops:  Continue latanoprost once daily at night each eye  Continue Cosopt BID each eye  Continue brimonidine TID each eye  -Continue regularly scheduled follow up with Dr. Patel    Post-operative care  - Discussed discontinuation of prednisolone therapy. Bleb healing well and no anterior or posterior inflammation.  -Stop PF    Counseled return/RD precautions    Patient disposition:   September 26 with Dr. Patel, or sooner changes    Patient seen with Dr. Rebolledo    Thank you for entrusting us with your care  Alysia Bailon MD, PGY3  Ophthalmology Resident  [facility]    Attending Physician Attestation:  Complete documentation of historical and exam elements from today's encounter can be found in the full encounter summary report (not reduplicated in this progress note).  I personally obtained the chief  complaint(s) and history of present illness.  I confirmed and edited as necessary the review of systems, past medical/surgical history, family history, social history, and examination findings as documented by others; and I examined the patient myself.  I personally reviewed the relevant tests, images, and reports as documented above.  I formulated and edited as necessary the assessment and plan and discussed the findings and management plan with the patient and family. . - Nydia Rebolledo MD

## 2023-08-27 ASSESSMENT — SLIT LAMP EXAM - LIDS: COMMENTS: WNL

## 2023-09-04 NOTE — NURSING NOTE
Chief Complaints and History of Present Illnesses   Patient presents with     Post Op (Ophthalmology) Left Eye     Chief Complaint(s) and History of Present Illness(es)     Post Op (Ophthalmology) Left Eye            Laterality: left eye    Associated symptoms: eye pain and foreign body sensation.  Negative for flashes and floaters    Treatments tried: eye drops          Comments    Here for 1 day post op BGI left eye. He notes eye has foreign body sensation and mild eye pain. Has drops but has not started. No flashes or floaters.    Justin Hardin COT 8:12 AM December 10, 2022                     Patent

## 2023-09-26 ENCOUNTER — OFFICE VISIT (OUTPATIENT)
Dept: OPHTHALMOLOGY | Facility: CLINIC | Age: 63
End: 2023-09-26
Attending: OPHTHALMOLOGY
Payer: COMMERCIAL

## 2023-09-26 DIAGNOSIS — H40.1133 PRIMARY OPEN ANGLE GLAUCOMA (POAG) OF BOTH EYES, SEVERE STAGE: Primary | ICD-10-CM

## 2023-09-26 PROCEDURE — 99213 OFFICE O/P EST LOW 20 MIN: CPT | Performed by: OPHTHALMOLOGY

## 2023-09-26 PROCEDURE — G0463 HOSPITAL OUTPT CLINIC VISIT: HCPCS | Performed by: OPHTHALMOLOGY

## 2023-09-26 ASSESSMENT — VISUAL ACUITY
OD_SC: NLP
OS_SC: 20/125
OS_PH_SC: 20/80
OS_PH_SC+: -1
OS_SC+: -1
METHOD: SNELLEN - LINEAR

## 2023-09-26 ASSESSMENT — CONF VISUAL FIELD
OS_SUPERIOR_TEMPORAL_RESTRICTION: 3
OD_INFERIOR_TEMPORAL_RESTRICTION: 1
OS_INFERIOR_NASAL_RESTRICTION: 1
OS_SUPERIOR_NASAL_RESTRICTION: 1
OD_INFERIOR_NASAL_RESTRICTION: 1
METHOD: COUNTING FINGERS
OD_SUPERIOR_NASAL_RESTRICTION: 1
OS_INFERIOR_TEMPORAL_RESTRICTION: 1
OD_SUPERIOR_TEMPORAL_RESTRICTION: 1

## 2023-09-26 ASSESSMENT — SLIT LAMP EXAM - LIDS
COMMENTS: NORMAL
COMMENTS: NORMAL

## 2023-09-26 ASSESSMENT — TONOMETRY
OS_IOP_MMHG: 13
IOP_METHOD: APPLANATION
OD_IOP_MMHG: 7

## 2023-09-26 ASSESSMENT — EXTERNAL EXAM - RIGHT EYE: OD_EXAM: NORMAL

## 2023-09-26 ASSESSMENT — EXTERNAL EXAM - LEFT EYE: OS_EXAM: NORMAL

## 2023-09-26 ASSESSMENT — CUP TO DISC RATIO: OS_RATIO: 0.85

## 2023-09-26 NOTE — PROGRESS NOTES
Chief Complaint/Presenting Concern: severe POAG each eye, s/p Left BGI 12/9/22.     History of Present Illness:   Parvez Vásquez is a 61 year old patient who presents for evaluation of glaucoma.  Referred by Dr. Kurtz for evaluation of severe glaucoma in both eyes. Patient reports that he was diagnosed with glaucoma in both eyes about 15 years ago. Patient has a complicated history of glaucoma and cataract surgeries in the right eye and now has NLP vision in the right eye. left eye has been progressing over the years but patient is hesitant to have surgery in the left eye due to poor outcome after surgery in the right eye. S/p left BGI 12/9/22     Today 9/26/23, patient reports vision remians blurry in the left eye, stable since last visit.      Current drops:  Left eye post-op medications:    Latanoprost nightly in left eye.   Cosopt BID left eye.   Taking brimonidine TID left eye.   Prednisolone eyedrop 3 times per day in left eye.   current medications in right eye:   Latanoprost at bedtime right eye.   Cosopt BID left eye.  Brimonidine TID right eye.    Relevant Past Medical/Family/Social History:  PMH: Non ischemic cardiopmyopathy, htn, AVNRT s/p debrillator  FH: non contributory     Relevant Review of Systems: none     Diagnosis: Primary open angle glaucoma  severe left eye   Year diagnosis: 2007  Previous glaucoma surgery/laser:  - Ahmed right eye (2011, Frank)  - CEIOL, lysis of iridocorneal adhesions, IFIS, - trabeculectomy/[facility] right eye (2012, Frank)  - BGI 12/9/22 left eye  Maximum intraocular pressure: unknown/32 mmHg   Family history: negative  CCT: 55/3 /523  Gonio: open to scleral spur 360;   Refractive status: low myope  Trauma history: negative  Steroid exposure: positive  Vasospastic disease: Migrane/Raynaud phenomenon: negative  A past hemodynamic crisis or Low BP: negative  Meds AEs/intolerance: unknown   PMHx: cardiomyopathy   Anticoagulants: none  Prior testing:  - Visual field August 11,  2022  - Right eye - unable  - Left eye - generalized depression high FP/FN  - OCT Macula August 11, 2022  - Right Eye: unable  - Left Eye: no subretinal or intraretinal fluid, complete PVD  - OCT Optic Nerve RNFL Spectralis August 11, 2022  - Right Eye: unable  - Left Eye: Diffuse RNFL thinning, floor effect        - OCT macula 3/7/23 left eye: normal retinal contour and thickness, posterior hyaloid detached but visible, no edema    Today's testing:   - IOP: 13 mmHg left eye    Additional Ocular History:     2. Cataract, left eye    3. PCIOL right eye, sulcus lens  - surgery complicated by IFIS and iris prolapse    4. NLP right eye   Total RD     Plan/Recommendations:  Discussed findings with patient.  Patient with advanced glaucoma left eye with uncontrolled IOP requiring oral Diamox with evidence of progression over time, now status post left BGI surgery 12/9/22. Avoided cataract surgery at time of glaucoma surgery given history of complication in the right eye, plan for cataract surgery after IOP is well controlled.  POM6 s/p left BGI 12/9/22, IOP well controlled   Continue latanoprost once daily at night each eye  Continue Cosopt BID each eye  Continue brimonidine TID each eye  Decline in vision could be due to worsening cataract, patient wants to wait on cataract surgery for now.     RTC in 3-4 months [facility], IOP     Mae Lema M.D.  Resident Physician, PGY-2  Department of Ophthalmology      Physician Attestation     Attending Physician Attestation:  Complete documentation of historical and exam elements from today's encounter can be found in the full encounter summary report (not reduplicated in this progress note). I personally obtained the chief complaint(s) and history of present illness. I confirmed and edited as necessary the review of systems, past medical/surgical history, family history, social history, and examination findings as documented by others; and I examined the patient myself. I personally  reviewed the relevant tests, images, and reports as documented above. I formulated and edited as necessary the assessment and plan and discussed the findings and management plan with the patient and any family members present at the time of the visit.  Carrillo Patel M.D., Glaucoma, September 26, 2023

## 2023-09-26 NOTE — NURSING NOTE
Chief Complaints and History of Present Illnesses   Patient presents with    Glaucoma Follow-Up     Chief Complaint(s) and History of Present Illness(es)       Glaucoma Follow-Up              Laterality: both eyes    Associated symptoms: eye pain (sometimes)    Pain scale: 0/10              Comments    Parvez is here to continue care for severe stage glaucoma of both eyes. He states he is not seeing as well LE since March.     Maurilio Williamson COT 9:58 AM September 26, 2023

## 2023-09-26 NOTE — PATIENT INSTRUCTIONS
Continue latanoprost (Green) once daily at night each eye   Cosopt (Timolol/Dorzolamide) (Blue) which is a blue top- apply 1 drop twice a day in each eye    Continue Brimonidine (Purple) 3 times per day in each eye

## 2024-01-30 ENCOUNTER — OFFICE VISIT (OUTPATIENT)
Dept: OPHTHALMOLOGY | Facility: CLINIC | Age: 64
End: 2024-01-30
Attending: OPHTHALMOLOGY
Payer: COMMERCIAL

## 2024-01-30 DIAGNOSIS — Z98.890 POSTOPERATIVE EYE STATE: ICD-10-CM

## 2024-01-30 DIAGNOSIS — H40.1133 PRIMARY OPEN ANGLE GLAUCOMA (POAG) OF BOTH EYES, SEVERE STAGE: ICD-10-CM

## 2024-01-30 DIAGNOSIS — H40.1133 PRIMARY OPEN ANGLE GLAUCOMA OF BOTH EYES, SEVERE STAGE: ICD-10-CM

## 2024-01-30 PROCEDURE — G0463 HOSPITAL OUTPT CLINIC VISIT: HCPCS | Performed by: OPHTHALMOLOGY

## 2024-01-30 PROCEDURE — 99213 OFFICE O/P EST LOW 20 MIN: CPT | Mod: GC | Performed by: OPHTHALMOLOGY

## 2024-01-30 PROCEDURE — 92133 CPTRZD OPH DX IMG PST SGM ON: CPT | Performed by: OPHTHALMOLOGY

## 2024-01-30 RX ORDER — DORZOLAMIDE HYDROCHLORIDE AND TIMOLOL MALEATE 20; 5 MG/ML; MG/ML
1 SOLUTION/ DROPS OPHTHALMIC 2 TIMES DAILY
Qty: 10 ML | Refills: 4 | Status: SHIPPED | OUTPATIENT
Start: 2024-01-30 | End: 2024-02-05

## 2024-01-30 RX ORDER — LATANOPROST 50 UG/ML
1 SOLUTION/ DROPS OPHTHALMIC EVERY EVENING
Qty: 2.5 ML | Refills: 11 | Status: SHIPPED | OUTPATIENT
Start: 2024-01-30 | End: 2024-02-05

## 2024-01-30 ASSESSMENT — TONOMETRY
OS_IOP_MMHG: 12
OD_IOP_MMHG: 7
IOP_METHOD: TONOPEN
OS_IOP_MMHG: 14
OD_IOP_MMHG: 8
IOP_METHOD: APPLANATION

## 2024-01-30 ASSESSMENT — VISUAL ACUITY
OS_SC: 20/100ECC
METHOD: SNELLEN - LINEAR
OS_SC+: +1
OD_SC: NLP
OS_PH_SC: 20/50
OS_PH_SC+: -2

## 2024-01-30 ASSESSMENT — CONF VISUAL FIELD
OD_SUPERIOR_NASAL_RESTRICTION: 1
OD_INFERIOR_TEMPORAL_RESTRICTION: 1
OD_INFERIOR_NASAL_RESTRICTION: 1
OD_SUPERIOR_TEMPORAL_RESTRICTION: 1

## 2024-01-30 ASSESSMENT — EXTERNAL EXAM - LEFT EYE: OS_EXAM: NORMAL

## 2024-01-30 ASSESSMENT — EXTERNAL EXAM - RIGHT EYE: OD_EXAM: NORMAL

## 2024-01-30 ASSESSMENT — CUP TO DISC RATIO: OS_RATIO: 0.85

## 2024-01-30 ASSESSMENT — SLIT LAMP EXAM - LIDS
COMMENTS: NORMAL
COMMENTS: NORMAL

## 2024-01-30 NOTE — PROGRESS NOTES
Chief Complaint/Presenting Concern: severe POAG each eye, s/p Left BGI 12/9/22.     History of Present Illness:   Parvez Vásquez is a 61 year old patient who presents for evaluation of glaucoma.  Referred by Dr. Kurtz for evaluation of severe glaucoma in both eyes. Patient reports that he was diagnosed with glaucoma in both eyes about 15 years ago. Patient has a complicated history of glaucoma and cataract surgeries in the right eye and now has NLP vision in the right eye. left eye has been progressing over the years but patient is hesitant to have surgery in the left eye due to poor outcome after surgery in the right eye. S/p left BGI 12/9/22     Today 1/30/24, patient presents for 4 month follow up. Vision has been worsening and he has some left eye pain.    Relevant Past Medical/Family/Social History:  PMH: Non ischemic cardiopmyopathy, htn, AVNRT s/p debrillator  FH: non contributory     Relevant Review of Systems: none     Diagnosis: Primary open angle glaucoma  severe left eye   Year diagnosis: 2007  Previous glaucoma surgery/laser:  - Ahmed right eye (2011, [facility])  - CEIOL, lysis of iridocorneal adhesions, IFIS, - trabeculectomy/[facility] right eye (2012, Frank)  - BGI 12/9/22 left eye  Maximum intraocular pressure: unknown/32 mmHg   Family history: negative  CCT: 55/3 /523  Gonio: open to scleral spur 360;   Refractive status: low myope  Trauma history: negative  Steroid exposure: positive  Vasospastic disease: Migrane/Raynaud phenomenon: negative  A past hemodynamic crisis or Low BP: negative  Meds AEs/intolerance: unknown   PMHx: cardiomyopathy   Anticoagulants: none  Prior testing:  - Visual field August 11, 2022  - Right eye - unable  - Left eye - generalized depression high FP/FN  - OCT Macula August 11, 2022  - Right Eye: unable  - Left Eye: no subretinal or intraretinal fluid, complete PVD  -  Today's testing:   - IOP: 12 mmHg left eye   OCT Optic Nerve RNFL Spectralis 1/30/24  - Right Eye: unable  -  Left Eye: Diffuse RNFL thinning, floor effect        - OCT macula 1/30/24 left eye: normal retinal contour and thickness, no edema     Additional Ocular History:     2. Cataract, left eye    3. PCIOL right eye, sulcus lens  - surgery complicated by IFIS and iris prolapse    4. NLP right eye   Total RD     Plan/Recommendations:  Discussed findings with patient.  Patient with advanced glaucoma left eye with uncontrolled IOP requiring oral Diamox with evidence of progression over time, IOP in high 30's prior to tube shunt surgery on 08/11/2022, now status post left BGI surgery on 12/9/22. Avoided cataract surgery at time of glaucoma surgery given history of complication in the right eye, planned for cataract surgery after IOP is well controlled.  s/p left BGI 12/9/22, IOP well controlled   Continue latanoprost once daily at night each eye  Continue Cosopt BID each eye  Continue brimonidine TID each eye  Decline in vision is a combination of worsening glaucoma from previous uncontrolled IOP and could be due to worsening cataract, patient wants to wait on cataract surgery for now. I recommended he gets a second opinion regarding cataract surgery, he is very anxious and every visit the patient discusses the same concerns. He is unable to take the decision regarding cataract surgery and suggests a second opinion might help, will refer to Dr. Drew.     RTC in 3-4 months [facility], IOP     Katelyn Gill MD  PGY3 Ophthalmology Resident  [facility]      Physician Attestation     Attending Physician Attestation:  Complete documentation of historical and exam elements from today's encounter can be found in the full encounter summary report (not reduplicated in this progress note). I personally obtained the chief complaint(s) and history of present illness. I confirmed and edited as necessary the review of systems, past medical/surgical history, family history, social history, and examination findings as documented by  others; and I examined the patient myself. I personally reviewed the relevant tests, images, and reports as documented above. I personally reviewed the ophthalmic test(s) associated with this encounter, agree with the interpretation(s) as documented by the resident/fellow and have edited the corresponding report(s) as necessary. I formulated and edited as necessary the assessment and plan and discussed the findings and management plan with the patient and any family members present at the time of the visit.  Carrillo Patel M.D., Glaucoma, January 30, 2024

## 2024-01-30 NOTE — NURSING NOTE
Chief Complaints and History of Present Illnesses   Patient presents with    Glaucoma Follow-Up     Primary open angle glaucoma (POAG) of both eyes, severe stage     Chief Complaint(s) and History of Present Illness(es)       Glaucoma Follow-Up              Laterality: both eyes    Associated symptoms: dryness, eye pain, headache and floaters.  Negative for tearing and flashes    Pain scale: 8/10    Comments: Primary open angle glaucoma (POAG) of both eyes, severe stage              Comments    Pt states vision is worse.  Pain 8/10, off and on LE>RE.  No flashes.  No change to floaters.  Increase in headaches.  Pt would like to go over drops with MD.    Latanoprost once daily at night each eye  Cosopt BID each eye  Brimonidine TID each eye  Timolol BE daily    KRYSTYNA Atkins January 30, 2024 10:00 AM

## 2024-02-05 DIAGNOSIS — H40.1133 PRIMARY OPEN ANGLE GLAUCOMA (POAG) OF BOTH EYES, SEVERE STAGE: ICD-10-CM

## 2024-02-05 DIAGNOSIS — Z98.890 POSTOPERATIVE EYE STATE: ICD-10-CM

## 2024-02-05 DIAGNOSIS — H40.1133 PRIMARY OPEN ANGLE GLAUCOMA OF BOTH EYES, SEVERE STAGE: ICD-10-CM

## 2024-02-05 RX ORDER — LATANOPROST 50 UG/ML
1 SOLUTION/ DROPS OPHTHALMIC EVERY EVENING
Qty: 2.5 ML | Refills: 11 | Status: SHIPPED | OUTPATIENT
Start: 2024-02-05 | End: 2024-02-13

## 2024-02-05 RX ORDER — DORZOLAMIDE HYDROCHLORIDE AND TIMOLOL MALEATE 20; 5 MG/ML; MG/ML
1 SOLUTION/ DROPS OPHTHALMIC 2 TIMES DAILY
Qty: 10 ML | Refills: 4 | Status: SHIPPED | OUTPATIENT
Start: 2024-02-05 | End: 2024-02-13

## 2024-02-05 NOTE — TELEPHONE ENCOUNTER
Renetta Glil MD     Prescriber not enrolled in [place] Medicaid  Needing alternative provider to send these medications    Rebekah Guerrero RN  Central Triage Red Flags/Med Refills

## 2024-02-13 RX ORDER — DORZOLAMIDE HYDROCHLORIDE AND TIMOLOL MALEATE 20; 5 MG/ML; MG/ML
1 SOLUTION/ DROPS OPHTHALMIC 2 TIMES DAILY
Qty: 10 ML | Refills: 4 | Status: SHIPPED | OUTPATIENT
Start: 2024-02-13

## 2024-02-13 RX ORDER — LATANOPROST 50 UG/ML
1 SOLUTION/ DROPS OPHTHALMIC EVERY EVENING
Qty: 2.5 ML | Refills: 11 | Status: SHIPPED | OUTPATIENT
Start: 2024-02-13 | End: 2024-04-30

## 2024-02-13 NOTE — TELEPHONE ENCOUNTER
[facility] Call Center    Phone Message    May a detailed message be left on voicemail: yes     Reason for Call: Medication Refill Request    Has the patient contacted the pharmacy for the refill? Yes   Name of medication being requested: latanoprost (XALATAN) 0.005 % ophthalmic solution  dorzolamide-timolol (COSOPT) 2-0.5 % ophthalmic solution  Provider who prescribed the medication: Dr Catalan  Pharmacy: [id number] - [place], [place] - [address] AT [facility] [address]   Date medication is needed: BRYAN Sullivan from [facility] [employer] calling back to check on status. Please have a provider that can sign off on Medicaid sign off on medications and send to [facility] on [address]     Laurie can be reached at [phone]. Thank you.    Action Taken: Message routed to:  Other: Med Refill    Travel Screening: Not Applicable

## 2024-02-13 NOTE — TELEPHONE ENCOUNTER
2/5/24 SANTILLI  latanoprost (XALATAN) 0.005 % ophthalmic solution 2.5 mL 11 2/5/2024      Place 1 drop into both eyes every evening - Both Eyes   dorzolamide-timolol (COSOPT) 2-0.5 % ophthalmic solution 10 mL 4 2/5/2024   Place 1 drop into both eyes 2 times daily - Both Eyes     Last visit date  1/30/2024  [facility] Carrillo Arias MD  Ophthalmology   Please have a provider that can sign off on Medicaid sign off on medications and [facility] Dr Patel

## 2024-04-04 ENCOUNTER — OFFICE VISIT (OUTPATIENT)
Dept: OPHTHALMOLOGY | Facility: CLINIC | Age: 64
End: 2024-04-04
Attending: OPHTHALMOLOGY
Payer: COMMERCIAL

## 2024-04-04 DIAGNOSIS — H04.123 BILATERAL DRY EYES: Primary | ICD-10-CM

## 2024-04-04 DIAGNOSIS — H40.1133 PRIMARY OPEN ANGLE GLAUCOMA (POAG) OF BOTH EYES, SEVERE STAGE: ICD-10-CM

## 2024-04-04 DIAGNOSIS — H25.12 AGE-RELATED NUCLEAR CATARACT OF LEFT EYE: ICD-10-CM

## 2024-04-04 PROCEDURE — G0463 HOSPITAL OUTPT CLINIC VISIT: HCPCS | Performed by: OPHTHALMOLOGY

## 2024-04-04 PROCEDURE — 92025 CPTRIZED CORNEAL TOPOGRAPHY: CPT | Performed by: OPHTHALMOLOGY

## 2024-04-04 PROCEDURE — 76519 ECHO EXAM OF EYE: CPT | Performed by: OPHTHALMOLOGY

## 2024-04-04 PROCEDURE — 99214 OFFICE O/P EST MOD 30 MIN: CPT | Performed by: OPHTHALMOLOGY

## 2024-04-04 ASSESSMENT — TONOMETRY
IOP_METHOD: TONOPEN
OS_IOP_MMHG: 18
OD_IOP_MMHG: 24

## 2024-04-04 ASSESSMENT — VISUAL ACUITY
METHOD: SNELLEN - LINEAR
OS_SC: 20/100ECC
OD_SC: NLP
OS_PH_SC: 20/70
OS_PH_SC+: -1
OS_SC+: -1

## 2024-04-04 ASSESSMENT — CONF VISUAL FIELD
METHOD: COUNTING FINGERS
OS_SUPERIOR_NASAL_RESTRICTION: 3
OS_SUPERIOR_TEMPORAL_RESTRICTION: 3
OS_INFERIOR_TEMPORAL_RESTRICTION: 3
OS_INFERIOR_NASAL_RESTRICTION: 3

## 2024-04-04 ASSESSMENT — REFRACTION_MANIFEST
OS_CYLINDER: +2.00
OS_ADD: +2.75
OS_SPHERE: -2.50
OS_AXIS: 170
OD_SPHERE: BALANCE

## 2024-04-04 ASSESSMENT — CUP TO DISC RATIO: OS_RATIO: 0.9

## 2024-04-04 ASSESSMENT — SLIT LAMP EXAM - LIDS
COMMENTS: PTOSIS, FLOPPY
COMMENTS: PTOSIS, FLOPPY

## 2024-04-04 ASSESSMENT — EXTERNAL EXAM - LEFT EYE: OS_EXAM: NORMAL

## 2024-04-04 ASSESSMENT — EXTERNAL EXAM - RIGHT EYE: OD_EXAM: NORMAL

## 2024-04-04 NOTE — PATIENT INSTRUCTIONS
Start preservative free artificial tears four times a day in both eyes     Start warm compresses to your eyelids twice a day for 5 minutes    Continue latanoprost (Green) once daily at night each eye     Cosopt (Timolol/Dorzolamide) (Blue) which is a blue top- apply 1 drop twice a day in each eye      Continue Brimonidine (Purple) 3 times per day in each eye   Ambulance

## 2024-04-04 NOTE — PROGRESS NOTES
Chief Complaint(s) and History of Present Illness(es)     Cataract Evaluation            Laterality: left eye    Associated symptoms: glare, haloes, starburst and a need for brighter   lights    Pain scale: 3/10    Comments: Pt here for cat eval.          Comments    Pt needs help with documentation for more hours of help at home.  LE VA seems dimmer.  Pain LE 3/10.  No flashes lately.  No floaters.  Pt is compliant with drops.    KRYSTYNA Atkins April 4, 2024 9:25 AM          Review of systems for the eyes was negative other than the pertinent positives/negatives listed in the HPI.      Assessment & Plan      Parvez Vásquez is a 63 year old male with the following diagnoses:   1. Bilateral dry eyes    2. Primary open angle glaucoma (POAG) of both eyes, severe stage    3. Age-related nuclear cataract of left eye      Referral from Dr. Mart for cataract evaluation left eye.  H/O complicated right eye cataract extraction c IFIS and iris prolapse.  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  Rare tears    Cataract and dry eye syndrome contributing to vision difficulty  Recommend aggressive treatment for dry eye syndrome prior to considering surgery   Refer to Dr. Valentin to establish low vision care.  Will benefit from low vision OT after that     Start preservative free artificial tears four times a day and as needed   Warm compresses twice a day   Continue latanoprost once daily at night each eye  Continue Cosopt BID each eye  Continue brimonidine TID each eye    Continue to see Dr. Mart for glaucoma care as scheduled    Patient disposition:   Return in about 3 months (around 7/4/2024) for VT only.           Attending Physician Attestation:  Complete documentation of historical and exam elements from today's encounter can be found in the full encounter summary report  (not reduplicated in this progress note).  I personally obtained the chief complaint(s) and history of present illness.  I confirmed and edited as necessary the review of systems, past medical/surgical history, family history, social history, and examination findings as documented by others; and I examined the patient myself.  I personally reviewed the relevant tests, images, and reports as documented above.  I formulated and edited as necessary the assessment and plan and discussed the findings and management plan with the patient and family. . - Panchito Drew MD

## 2024-04-04 NOTE — NURSING NOTE
Chief Complaints and History of Present Illnesses   Patient presents with    Cataract Evaluation     Pt here for cat eval.     Chief Complaint(s) and History of Present Illness(es)       Cataract Evaluation              Laterality: left eye    Associated symptoms: glare, haloes, starburst and a need for brighter lights    Pain scale: 3/10    Comments: Pt here for cat eval.              Comments    Pt needs help with documentation for more hours of help at home.  LE VA seems dimmer.  Pain LE 3/10.  No flashes lately.  No floaters.  Pt is compliant with drops.    KRYSTYNA Atkins April 4, 2024 9:25 AM

## 2024-04-11 ENCOUNTER — OFFICE VISIT (OUTPATIENT)
Dept: OPTOMETRY | Facility: CLINIC | Age: 64
End: 2024-04-11
Payer: COMMERCIAL

## 2024-04-11 DIAGNOSIS — H53.143 PHOTOPHOBIA OF BOTH EYES: ICD-10-CM

## 2024-04-11 DIAGNOSIS — H40.1133 PRIMARY OPEN ANGLE GLAUCOMA (POAG) OF BOTH EYES, SEVERE STAGE: Primary | ICD-10-CM

## 2024-04-11 DIAGNOSIS — H40.1133 PRIMARY OPEN ANGLE GLAUCOMA (POAG) OF BOTH EYES, SEVERE STAGE: ICD-10-CM

## 2024-04-11 DIAGNOSIS — H40.1133 PRIMARY OPEN ANGLE GLAUCOMA OF BOTH EYES, SEVERE STAGE: ICD-10-CM

## 2024-04-11 DIAGNOSIS — H52.13 MYOPIA OF BOTH EYES: ICD-10-CM

## 2024-04-11 DIAGNOSIS — Z98.890 POSTOPERATIVE EYE STATE: ICD-10-CM

## 2024-04-11 DIAGNOSIS — H04.123 DRY EYES: ICD-10-CM

## 2024-04-11 ASSESSMENT — VISUAL ACUITY
OD_SC: NLP
METHOD: SNELLEN - LINEAR
OS_PH_SC: 20/40
OS_PH_SC+: -2
OS_SC: 20/125

## 2024-04-11 ASSESSMENT — CONF VISUAL FIELD
OD_SUPERIOR_TEMPORAL_RESTRICTION: 1
OS_INFERIOR_TEMPORAL_RESTRICTION: 3
OD_INFERIOR_TEMPORAL_RESTRICTION: 1
OS_INFERIOR_NASAL_RESTRICTION: 3
OD_SUPERIOR_NASAL_RESTRICTION: 1
OS_SUPERIOR_NASAL_RESTRICTION: 3
OD_INFERIOR_NASAL_RESTRICTION: 1
OS_SUPERIOR_TEMPORAL_RESTRICTION: 3

## 2024-04-11 ASSESSMENT — CUP TO DISC RATIO: OS_RATIO: 0.9

## 2024-04-11 ASSESSMENT — TONOMETRY
OD_IOP_MMHG: 23
OS_IOP_MMHG: 13
IOP_METHOD: ICARE

## 2024-04-11 ASSESSMENT — EXTERNAL EXAM - LEFT EYE: OS_EXAM: NORMAL

## 2024-04-11 ASSESSMENT — REFRACTION_MANIFEST
OS_ADD: +3.50
OS_SPHERE: -2.00
OS_CYLINDER: SPHERE
OD_SPHERE: BALANCE
OD_ADD: +3.50

## 2024-04-11 ASSESSMENT — SLIT LAMP EXAM - LIDS
COMMENTS: PTOSIS, FLOPPY
COMMENTS: PTOSIS, FLOPPY

## 2024-04-11 ASSESSMENT — EXTERNAL EXAM - RIGHT EYE: OD_EXAM: NORMAL

## 2024-04-11 NOTE — Clinical Note
Thanks for sending. VA improved to 20/40- in his left eye. It may be reasonable to hold off on cataract surgery given his significant reservations regarding additional procedures and vision loss with his right eye's surgery. Additional training with OT and with an O+M specialist were recommended as well.

## 2024-04-11 NOTE — PROGRESS NOTES
Assessment/Plan  (H40.1193) Primary open angle glaucoma (POAG) of both eyes, severe stage  (primary encounter diagnosis)  Comment: OD: NLP, OS: 20/40-. Severe visual field constriction.   Plan: Discussed findings with patient, as well as permanent nature of glaucomatous vision loss. Continue following treatment recommendations of ophthalmology. Given improvement in acuity, it may be reasonable to hold off on cataract surgery for now, but will defer to judgment of Lalita Patel and Viki. Monitor as needed. Letter for PCW faxed for patient to increase hours while condition stabilizes    (H04.123) Dry eyes  Plan: Continue aggressively lubricating his eyes. Sample of PFAT's dispensed to patient to use.    (H53.143) Photophobia of both eyes  Plan: Transitions tint recommended in glasses to help with glare.     (H52.13) Myopia of both eyes  Plan: Discussed findings with patient. New spectacle prescription dispensed to patient. Patient is welcome to return to clinic with prolonged adaptation difficulties.           60 minutes were spent on the date of the encounter doing chart review, history and exam, documentation, and further activities as noted above.    Complete documentation of historical and exam elements from today's encounter can  be found in the full encounter summary report (not reduplicated in this progress  note). I personally obtained the chief complaint(s) and history of present illness. I  confirmed and edited as necessary the review of systems, past medical/surgical  history, family history, social history, and examination findings as documented by  others; and I examined the patient myself. I personally reviewed the relevant tests,  images, and reports as documented above. I formulated and edited as necessary the  assessment and plan and discussed the findings and management plan with the  patient and family.    Owen Valentin, OD

## 2024-04-11 NOTE — LETTER
April 11, 2024      Re: Parvez Vásquez   1960    To Whom It May Concern:    This is to confirm that the above patient was seen on 4/11/2024.  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 His vision has been steadily worsening. He requires daily assistance from a personal care worker to maintain his activities of daily living. 4.5 hours of assistance has not been adequate for him. I would recommend that this number be increased to 8 hours daily, at least until his eye conditions stabilize.     Thank you for your cooperation in this matter.  Please do not hesitate to contact me if you have any further questions.    Sincerely,            SHERRIE ODEN

## 2024-04-11 NOTE — NURSING NOTE
Chief Complaints and History of Present Illnesses   Patient presents with    Low Vision     Pt here for low vision evaluation per Dr Drew.      Chief Complaint(s) and History of Present Illness(es)       Low Vision              Comments: Pt here for low vision evaluation per Dr Drew.               Comments    Pt has hx of dry eye, glaucoma and cataracts. Pt is to have cataract surgery with Dr Drew after aggressive dry eye therapy.     Argentina Hercules, COA on 4/11/2024 at 9:24 AM

## 2024-04-15 DIAGNOSIS — H40.1133 PRIMARY OPEN ANGLE GLAUCOMA OF BOTH EYES, SEVERE STAGE: ICD-10-CM

## 2024-04-15 DIAGNOSIS — Z98.890 POSTOPERATIVE EYE STATE: ICD-10-CM

## 2024-04-15 DIAGNOSIS — H40.1133 PRIMARY OPEN ANGLE GLAUCOMA (POAG) OF BOTH EYES, SEVERE STAGE: ICD-10-CM

## 2024-04-24 DIAGNOSIS — H40.1133 PRIMARY OPEN ANGLE GLAUCOMA (POAG) OF BOTH EYES, SEVERE STAGE: ICD-10-CM

## 2024-04-24 DIAGNOSIS — H40.1133 PRIMARY OPEN ANGLE GLAUCOMA OF BOTH EYES, SEVERE STAGE: ICD-10-CM

## 2024-04-24 DIAGNOSIS — Z98.890 POSTOPERATIVE EYE STATE: ICD-10-CM

## 2024-04-30 ENCOUNTER — TRANSFERRED RECORDS (OUTPATIENT)
Dept: HEALTH INFORMATION MANAGEMENT | Facility: CLINIC | Age: 64
End: 2024-04-30

## 2024-04-30 ENCOUNTER — OFFICE VISIT (OUTPATIENT)
Dept: OPHTHALMOLOGY | Facility: CLINIC | Age: 64
End: 2024-04-30
Attending: OPHTHALMOLOGY
Payer: COMMERCIAL

## 2024-04-30 DIAGNOSIS — H40.1133 PRIMARY OPEN ANGLE GLAUCOMA OF BOTH EYES, SEVERE STAGE: ICD-10-CM

## 2024-04-30 DIAGNOSIS — H40.1133 PRIMARY OPEN ANGLE GLAUCOMA (POAG) OF BOTH EYES, SEVERE STAGE: Primary | ICD-10-CM

## 2024-04-30 PROCEDURE — 99213 OFFICE O/P EST LOW 20 MIN: CPT | Mod: GC | Performed by: OPHTHALMOLOGY

## 2024-04-30 PROCEDURE — G0463 HOSPITAL OUTPT CLINIC VISIT: HCPCS | Performed by: OPHTHALMOLOGY

## 2024-04-30 RX ORDER — BRIMONIDINE TARTRATE 2 MG/ML
1 SOLUTION/ DROPS OPHTHALMIC 3 TIMES DAILY
Qty: 10 ML | Refills: 11 | Status: SHIPPED | OUTPATIENT
Start: 2024-04-30

## 2024-04-30 RX ORDER — LATANOPROST 50 UG/ML
1 SOLUTION/ DROPS OPHTHALMIC EVERY EVENING
Qty: 2.5 ML | Refills: 11 | Status: SHIPPED | OUTPATIENT
Start: 2024-04-30

## 2024-04-30 ASSESSMENT — CONF VISUAL FIELD
OS_INFERIOR_TEMPORAL_RESTRICTION: 3
OD_SUPERIOR_TEMPORAL_RESTRICTION: 1
OD_INFERIOR_NASAL_RESTRICTION: 1
METHOD: COUNTING FINGERS
OD_SUPERIOR_NASAL_RESTRICTION: 1
OD_INFERIOR_TEMPORAL_RESTRICTION: 1
OS_SUPERIOR_NASAL_RESTRICTION: 3
OS_INFERIOR_NASAL_RESTRICTION: 3
OS_SUPERIOR_TEMPORAL_RESTRICTION: 3

## 2024-04-30 ASSESSMENT — TONOMETRY
OD_IOP_MMHG: 19
OS_IOP_MMHG: 15
IOP_METHOD: TONOPEN
OD_IOP_MMHG: 20
OD_IOP_MMHG: 15
IOP_METHOD: TONOPEN
IOP_METHOD: APPLANATION
OS_IOP_MMHG: 10
OS_IOP_MMHG: X

## 2024-04-30 ASSESSMENT — VISUAL ACUITY
OS_CC: 20/40
OS_SC: 20/80 ECC
OS_PH_CC+: -1
OS_PH_CC: 20/25
OS_CC+: -2
OS_PH_SC: 20/40
METHOD: SNELLEN - LINEAR
OD_SC: NLP

## 2024-04-30 ASSESSMENT — CUP TO DISC RATIO: OS_RATIO: 0.9

## 2024-04-30 ASSESSMENT — SLIT LAMP EXAM - LIDS
COMMENTS: PTOSIS, FLOPPY
COMMENTS: PTOSIS, FLOPPY

## 2024-04-30 ASSESSMENT — EXTERNAL EXAM - RIGHT EYE: OD_EXAM: NORMAL

## 2024-04-30 ASSESSMENT — EXTERNAL EXAM - LEFT EYE: OS_EXAM: NORMAL

## 2024-04-30 NOTE — PROGRESS NOTES
Chief Complaint/Presenting Concern: severe POAG each eye, s/p Left BGI 12/9/22.     History of Present Illness:   Parvez Vásquez is a 61 year old patient who presents for evaluation of glaucoma.  Referred by Dr. Kurtz for evaluation of severe glaucoma in both eyes. Patient reports that he was diagnosed with glaucoma in both eyes about 15 years ago. Patient has a complicated history of glaucoma and cataract surgeries in the right eye and now has NLP vision in the right eye. left eye has been progressing over the years but patient is hesitant to have surgery in the left eye due to poor outcome after surgery in the right eye. S/p left BGI 12/9/22     Today 4/30/24, patient presents for 3 month follow up. At last visit patient had similar complaints from prior visits regarding vision in left eye being poor. He was referred to Dr. Drew for second opinion on cataract surgery. He met with him on 4/4/24 and elected not to proceed with cataract surgery and he was referred to [facility]. He saw Dr. Valentin on 4/11/24 and he was given a new prescription and was able to see 20/40, but has not had the chance to send the prescription to an optical shop.     Relevant Past Medical/Family/Social History:  PMH: Non ischemic cardiopmyopathy, htn, AVNRT s/p debrillator  FH: non contributory     Relevant Review of Systems: none     Diagnosis: Primary open angle glaucoma  severe left eye   Year diagnosis: 2007  Previous glaucoma surgery/laser:  - Ahmed right eye (2011, Marie)  - CEIOL, lysis of iridocorneal adhesions, IFIS, - trabeculectomy/[facility] right eye (2012, Frank)  - BGI 12/9/22 left eye  Maximum intraocular pressure: unknown/32 mmHg   Family history: negative  CCT: 55/3 /523  Gonio: open to scleral spur 360;   Refractive status: low myope  Trauma history: negative  Steroid exposure: positive  Vasospastic disease: Migrane/Raynaud phenomenon: negative  A past hemodynamic crisis or Low BP: negative  Meds AEs/intolerance: unknown    PMHx: cardiomyopathy   Anticoagulants: none  Prior testing:  - Visual field August 11, 2022  - Right eye - unable  - Left eye - generalized depression high FP/FN  - OCT Macula August 11, 2022  - Right Eye: unable  - Left Eye: no subretinal or intraretinal fluid, complete PVD     OCT Optic Nerve RNFL Spectralis 1/30/24  - Right Eye: unable  - Left Eye: Diffuse RNFL thinning, floor effect        - OCT macula 1/30/24 left eye: normal retinal contour and thickness, no edema     Today's testing:  none    Additional Ocular History:     2. Cataract, left eye    3. PCIOL right eye, sulcus lens  - surgery complicated by IFIS and iris prolapse    4. NLP right eye   Total RD     Plan/Recommendations:  Discussed findings with patient.  Patient with advanced glaucoma left eye with uncontrolled IOP requiring oral Diamox with evidence of progression over time, IOP in high 30's prior to tube shunt surgery on 08/11/2022, now status post left BGI surgery on 12/9/22. Avoided cataract surgery at time of glaucoma surgery given history of complication in the right eye, planned for cataract surgery after IOP is well controlled. Met with Dr. Drew in April 2024 and decided to not pursue surgery.   s/p left BGI 12/9/22, IOP well controlled   Continue latanoprost once daily at night each eye  Continue Cosopt BID each eye  Continue brimonidine TID each eye  Has follow-up with Dr. Drew in July 2024    RTC in 3-4 months VA, IOP     Jose Rafael Thomas MD  PGY3 Ophthalmology Resident  [facility]      Physician Attestation       Physician Attestation     Attending Physician Attestation:  Complete documentation of historical and exam elements from today's encounter can be found in the full encounter summary report (not reduplicated in this progress note). I personally obtained the chief complaint(s) and history of present illness. I confirmed and edited as necessary the review of systems, past medical/surgical history, family history,  social history, and examination findings as documented by others; and I examined the patient myself. I personally reviewed the relevant tests, images, and reports as documented above. I formulated and edited as necessary the assessment and plan and discussed the findings and management plan with the patient and any family members present at the time of the visit.  Carrillo Patel M.D., Glaucoma, April 30, 2024

## 2024-07-10 RX ORDER — BRIMONIDINE TARTRATE 2 MG/ML
SOLUTION/ DROPS OPHTHALMIC
Qty: 10 ML | Refills: 11 | OUTPATIENT
Start: 2024-07-10

## 2024-07-14 RX ORDER — BRIMONIDINE TARTRATE 2 MG/ML
SOLUTION/ DROPS OPHTHALMIC
Qty: 10 ML | Refills: 11 | OUTPATIENT
Start: 2024-07-14

## 2024-07-14 RX ORDER — TIMOLOL MALEATE 5 MG/ML
1 SOLUTION/ DROPS OPHTHALMIC 2 TIMES DAILY
Qty: 5 ML | OUTPATIENT
Start: 2024-07-14

## 2024-07-23 RX ORDER — BRIMONIDINE TARTRATE 2 MG/ML
SOLUTION/ DROPS OPHTHALMIC
Qty: 10 ML | Refills: 11 | OUTPATIENT
Start: 2024-07-23

## 2024-08-20 ENCOUNTER — NURSE TRIAGE (OUTPATIENT)
Dept: OPHTHALMOLOGY | Facility: CLINIC | Age: 64
End: 2024-08-20
Payer: COMMERCIAL

## 2024-08-20 ENCOUNTER — OFFICE VISIT (OUTPATIENT)
Dept: OPHTHALMOLOGY | Facility: CLINIC | Age: 64
End: 2024-08-20
Payer: COMMERCIAL

## 2024-08-20 DIAGNOSIS — H40.1133 PRIMARY OPEN ANGLE GLAUCOMA OF BOTH EYES, SEVERE STAGE: Primary | ICD-10-CM

## 2024-08-20 DIAGNOSIS — H53.8 BLURRY VISION, LEFT EYE: ICD-10-CM

## 2024-08-20 PROCEDURE — 92134 CPTRZ OPH DX IMG PST SGM RTA: CPT

## 2024-08-20 PROCEDURE — 99213 OFFICE O/P EST LOW 20 MIN: CPT

## 2024-08-20 PROCEDURE — G0463 HOSPITAL OUTPT CLINIC VISIT: HCPCS

## 2024-08-20 ASSESSMENT — VISUAL ACUITY
OD_SC: NLP
OS_PH_SC: 20/60
METHOD: SNELLEN - LINEAR
OS_PH_SC+: -1
OS_SC: 20/100

## 2024-08-20 ASSESSMENT — EXTERNAL EXAM - RIGHT EYE: OD_EXAM: NORMAL

## 2024-08-20 ASSESSMENT — PACHYMETRY
OD_CT(UM): 553
OS_CT(UM): 523

## 2024-08-20 ASSESSMENT — EXTERNAL EXAM - LEFT EYE: OS_EXAM: NORMAL

## 2024-08-20 ASSESSMENT — CUP TO DISC RATIO: OS_RATIO: 0.9

## 2024-08-20 ASSESSMENT — TONOMETRY
OS_IOP_MMHG: 10
IOP_METHOD: TONOPEN
OD_IOP_MMHG: 11

## 2024-08-20 ASSESSMENT — SLIT LAMP EXAM - LIDS
COMMENTS: PTOSIS, FLOPPY
COMMENTS: PTOSIS, FLOPPY

## 2024-08-20 NOTE — TELEPHONE ENCOUNTER
Returned call to patient.     Patient and caregiver confirmed appointment time. Aware of date/time/location/duration of appointment. All questions were answered.     Elaine Sewell RN 9:00 AM 08/20/24

## 2024-08-20 NOTE — NURSING NOTE
Chief Complaints and History of Present Illnesses   Patient presents with    Glaucoma Follow-Up     3 month follow up POAG of both eyes     Chief Complaint(s) and History of Present Illness(es)       Glaucoma Follow-Up              Associated symptoms: eye pain    Pain scale: 8/10    Comments: 3 month follow up POAG of both eyes              Comments    Beginning 2 weeks ago, left eye burning (8/10).  He tells me his left eye vision has gotten blurrier and cloudier.   No treatments attempted other than prescribed eyedrops.  Compliant with eyedrop schedule.     Ocular Meds:  Latanaprost qPM each eye   Cosopt BID each eye   Brimonidine TID each eye     Mir Ho 10:27 AM August 20, 2024

## 2024-08-20 NOTE — TELEPHONE ENCOUNTER
"Blind in Right eye. Left eye blurry since surgery 12/2022. Increased symptoms 2 weeks ago, using eye gtts . Increased blurriness. No double vision. Rating pain as \"8\" is keeping him up at night due to pain. Is rubbing eyes. No other sx of illness , afebrile. Is anxious about it .. No Headache. No other neurological changes. Patient would like to get appointment to have this looked at. Please call him at [phone].    Thania ROSS RN  [facility] Central Nursing/Red Flag Triage & Med Refill Team  Reason for Disposition   Blurred vision and new or worsening    Additional Information   Negative: Followed an eye injury   Negative: Eye pain from chemical in the eye   Negative: Eye pain from foreign body in eye   Negative: Has sinus pain or pressure   Negative: SEVERE eye pain   Negative: Complete loss of vision in one or both eyes   Negative: Eyelids are very swollen (shut or almost) and fever   Negative: Eyelid (outer) is very red and fever   Negative: Foreign body sensation ('feels like something is in there') and irrigation didn't help   Negative: Vomiting   Negative: Ulcer or sore seen on the cornea (clear center part of the eye)   Negative: Recent eye surgery and increasing eye pain    Protocols used: Eye Pain and Other Symptoms-A-[place]    "

## 2024-08-20 NOTE — TELEPHONE ENCOUNTER
Pt reports that for about a week now he has had Lt eye burning, pain and pressure     Returned call to patient to discuss symptoms. Patient reports pain 8/10, burning sensation and pressure in his left eye that has increased over the last 2 weeks. Patient reports worsening blurred vision.     Tentatively scheduled patient today 8/20/24 with Dr. De Leon, appointment time 10:05 AM, arrival time of 9:50 AM. Patient to call his ride to confirm if appointment time will work. Patient will call back to confirm appointment time for today or reschedule.     Elaine Sewell RN 8:27 AM 08/20/24

## 2024-08-20 NOTE — PROGRESS NOTES
Chief Complaint/Presenting Concern: severe POAG each eye, s/p Left BGI 12/9/22.     History of Present Illness:   Parvez Vásquez is a 61 year old patient who presents for evaluation of glaucoma.  Referred by Dr. Kurtz for evaluation of severe glaucoma in both eyes. Patient reports that he was diagnosed with glaucoma in both eyes about 15 years ago. Patient has a complicated history of glaucoma and cataract surgeries in the right eye and now has NLP vision in the right eye. left eye has been progressing over the years but patient is hesitant to have surgery in the left eye due to poor outcome after surgery in the right eye. S/p left BGI 12/9/22     Today 4/30/24, patient presents for 3 month follow up. At last visit patient had similar complaints from prior visits regarding vision in left eye being poor. He was referred to Dr. Drew for second opinion on cataract surgery. He met with him on 4/4/24 and elected not to proceed with cataract surgery and he was referred to [facility]. He saw Dr. Valentin on 4/11/24 and he was given a new prescription and was able to see 20/40, but has not had the chance to send the prescription to an optical shop.     Relevant Past Medical/Family/Social History:  PMH: Non ischemic cardiopmyopathy, htn, AVNRT s/p debrillator  FH: non contributory     Relevant Review of Systems: none     Diagnosis: Primary open angle glaucoma  severe left eye   Year diagnosis: 2007  Previous glaucoma surgery/laser:  - Ahmed right eye (2011, Marie)  - CEIOL, lysis of iridocorneal adhesions, IFIS, - trabeculectomy/[facility] right eye (2012, Frank)  - BGI 12/9/22 left eye  Maximum intraocular pressure: unknown/32 mmHg   Family history: negative  CCT: 55/3 /523  Gonio: open to scleral spur 360;   Refractive status: low myope  Trauma history: negative  Steroid exposure: positive  Vasospastic disease: Migrane/Raynaud phenomenon: negative  A past hemodynamic crisis or Low BP: negative  Meds AEs/intolerance: unknown    PMHx: cardiomyopathy   Anticoagulants: none  Prior testing:  - Visual field August 11, 2022  - Right eye - unable  - Left eye - generalized depression high FP/FN  - OCT Macula August 11, 2022  - Right Eye: unable  - Left Eye: no subretinal or intraretinal fluid, complete PVD     OCT Optic Nerve RNFL Spectralis 1/30/24  - Right Eye: unable  - Left Eye: Diffuse RNFL thinning, floor effect        - OCT macula 1/30/24 left eye: normal retinal contour and thickness, no edema     Today's testing:  none    Additional Ocular History:     2. Cataract, left eye    3. PCIOL right eye, sulcus lens  - surgery complicated by IFIS and iris prolapse    4. NLP right eye   Total RD     Plan/Recommendations:  Discussed findings with patient.  Patient with advanced glaucoma left eye with uncontrolled IOP requiring oral Diamox with evidence of progression over time, IOP in high 30's prior to tube shunt surgery on 08/11/2022, now status post left BGI surgery on 12/9/22. Avoided cataract surgery at time of glaucoma surgery given history of complication in the right eye, planned for cataract surgery after IOP is well controlled. Met with Dr. Drew in April 2024 and decided to not pursue surgery.   s/p left BGI 12/9/22, IOP well controlled   Continue latanoprost once daily at night each eye  Continue Cosopt BID each eye  Continue brimonidine TID each eye  Had long discussion with patient that tube surgery is not to improve vision but control IOP.   Discussed that decrease vision is attributed to severity of glaucoma and needs to get updated glasses prescribed by Dr. Valentin.     RTC in 3-4 months VA, IOP     Complete documentation of historical and exam elements from today's encounter can be found in the full encounter summary report (not reduplicated in this progress note). I personally obtained the chief complaint(s) and history of present illness. I confirmed and edited as necessary the review of systems, past medical/surgical history,  family history, social history, and examination findings as document by others; and I examined the patient myself. I personally reviewed the relevant tests, images, and reports as documented above. I formulated and edited as necessary the assessment and plan and discussed the findings and management plan with the patient and family.    Randal De Leon OD

## 2024-08-20 NOTE — TELEPHONE ENCOUNTER
Caller reporting the following red-flag symptom(s): Pt reports that for about a week now he has had Lt eye burning, pain and pressure.    Per the system red-flag symptom policy, patient was instructed to:  speak with a Registered Nurse    Action:  Patient warm transferred to a Registered Nurse

## 2024-08-20 NOTE — PATIENT INSTRUCTIONS
-Continue latanoprost (Green Top) once daily at night each eye   -Continue Cosopt (Timolol/Dorzolamide) (Blue Top) apply 1 drop twice a day in each eye    -Continue Brimonidine (Purple Top) 3 times per day in each eye

## 2024-11-11 ENCOUNTER — NURSE TRIAGE (OUTPATIENT)
Dept: OPHTHALMOLOGY | Facility: CLINIC | Age: 64
End: 2024-11-11
Payer: COMMERCIAL

## 2024-11-11 NOTE — TELEPHONE ENCOUNTER
Caller reporting the following red-flag symptom(s): Eye pain- left eye    Per the system red-flag symptom policy, patient was instructed to:  speak with a Registered Nurse    Action:  Patient warm transferred to a Registered Nurse

## 2024-11-11 NOTE — TELEPHONE ENCOUNTER
Eye pain times one month and currently scheduled next Tuesday with Dr De Leon    Offered tomorrow afternoon and pt unable.    Scheduled Thursday with Dr. De Leon in open return glaucoma time slot.    Pt aware of date/time/location.    Neil Frazier RN 2:29 PM 11/11/24

## 2024-11-11 NOTE — TELEPHONE ENCOUNTER
"Sharp eye pain in Left Eye. Onset Last week or perhaps for a month. (Patient is a poor historian) Is calling with one of his medical workers on the phone to help him understand directions. He reports he has No vision in R eye, 90 percent nerve gone in Left eye. Only see's a small dot in Left eye.Had surgery Left eye 12/9/22. Pain Coming and going . Pain currently is \"6\". \"Dot\" is getting darker. Also has H/A which comes and goes. Patient does have appointment already for 11/19/24 for check in 905, appt 920am at [facility]. He verifys directions to clinic , does have to arrange transportation and it takes him two days to arrange transportation. Will need to stay with this clinic so as not to confuse him on where he goes but may need sooner appointment keeping in mind transport issue.   Reason for Disposition   Blurred vision and new or worsening    Additional Information   Negative: Followed an eye injury   Negative: Eye pain from chemical in the eye   Negative: Eye pain from foreign body in eye   Negative: Has sinus pain or pressure   Negative: SEVERE eye pain   Negative: Complete loss of vision in one or both eyes   Negative: Eyelids are very swollen (shut or almost) and fever   Negative: Eyelid (outer) is very red and fever   Negative: Foreign body sensation ('feels like something is in there') and irrigation didn't help   Negative: Vomiting   Negative: Ulcer or sore seen on the cornea (clear center part of the eye)   Negative: Recent eye surgery and increasing eye pain    Protocols used: Eye Pain and Other Symptoms-A-[place]  Thania ROSS RN  [facility] Central Nursing/Red Flag Triage & Med Refill Team  "

## 2024-11-14 ENCOUNTER — OFFICE VISIT (OUTPATIENT)
Dept: OPHTHALMOLOGY | Facility: CLINIC | Age: 64
End: 2024-11-14
Payer: COMMERCIAL

## 2024-11-14 DIAGNOSIS — H40.1133 PRIMARY OPEN ANGLE GLAUCOMA OF BOTH EYES, SEVERE STAGE: Primary | ICD-10-CM

## 2024-11-14 DIAGNOSIS — H04.123 BILATERAL DRY EYES: ICD-10-CM

## 2024-11-14 DIAGNOSIS — H53.8 BLURRY VISION, LEFT EYE: ICD-10-CM

## 2024-11-14 PROCEDURE — G0463 HOSPITAL OUTPT CLINIC VISIT: HCPCS

## 2024-11-14 ASSESSMENT — VISUAL ACUITY
OD_SC: NLP
OS_SC: 20/60
OS_PH_SC+: -2
OS_PH_SC: 20/40
OS_SC+: -1
METHOD: SNELLEN - LINEAR

## 2024-11-14 ASSESSMENT — TONOMETRY
OS_IOP_MMHG: 10
OS_IOP_MMHG: 12
IOP_METHOD: ICARE
OD_IOP_MMHG: 12
OD_IOP_MMHG: 13
IOP_METHOD: APPLANATION

## 2024-11-14 ASSESSMENT — SLIT LAMP EXAM - LIDS
COMMENTS: PTOSIS, FLOPPY
COMMENTS: PTOSIS, FLOPPY

## 2024-11-14 ASSESSMENT — EXTERNAL EXAM - LEFT EYE: OS_EXAM: NORMAL

## 2024-11-14 ASSESSMENT — EXTERNAL EXAM - RIGHT EYE: OD_EXAM: NORMAL

## 2024-11-14 NOTE — PROGRESS NOTES
Chief Complaint/Presenting Concern: severe POAG each eye, s/p Left BGI 12/9/22.     History of Present Illness:   Parvez Vásquez is a 61 year old patient who presents for evaluation of glaucoma.  Referred by Dr. Kurtz for evaluation of severe glaucoma in both eyes. Patient reports that he was diagnosed with glaucoma in both eyes about 15 years ago. Patient has a complicated history of glaucoma and cataract surgeries in the right eye and now has NLP vision in the right eye. left eye has been progressing over the years but patient is hesitant to have surgery in the left eye due to poor outcome after surgery in the right eye. S/p left BGI 12/9/22     Patient presents for 3 month follow up. At previous visits patient had similar complaints from prior visits regarding vision in left eye being poor. He was referred to Dr. Drew for second opinion on cataract surgery. He met with him on 4/4/24 and elected not to proceed with cataract surgery and he was referred to [facility].He saw Dr. Valentin on 4/11/24 and he was given a new prescription and was able to see 20/40, but has not had the chance to send the prescription to an optical shop.     Today, 11/15/2024, Patient complains of pain and vision issues. Similar to previous visits.    Current Medications:  Latanoprost once daily at night each eye  Cosopt BID each eye  Brimonidine TID each eye    Relevant Past Medical/Family/Social History:  PMH: Non ischemic cardiopmyopathy, htn, AVNRT s/p debrillator  FH: non contributory     Relevant Review of Systems: none     Diagnosis: Primary open angle glaucoma  severe left eye   Year diagnosis: 2007  Previous glaucoma surgery/laser:  - Ahmed right eye (2011, Marie)  - CEIOL, lysis of iridocorneal adhesions, IFIS, - trabeculectomy/[facility] right eye (2012, Marie)  - BGI 12/9/22 left eye  Maximum intraocular pressure: unknown/32 mmHg   Family history: negative  CCT: 55/3 /523  Gonio: open to scleral spur 360;   Refractive status: low  myope  Trauma history: negative  Steroid exposure: positive  Vasospastic disease: Migrane/Raynaud phenomenon: negative  A past hemodynamic crisis or Low BP: negative  Meds AEs/intolerance: unknown   PMHx: cardiomyopathy   Anticoagulants: none  Prior testing:  - Visual field August 11, 2022  - Right eye - unable  - Left eye - generalized depression high FP/FN  - OCT Macula August 11, 2022  - Right Eye: unable  - Left Eye: no subretinal or intraretinal fluid, complete PVD     OCT Optic Nerve RNFL Spectralis 1/30/24  - Right Eye: unable  - Left Eye: Diffuse RNFL thinning, floor effect        - OCT macula 1/30/24 left eye: normal retinal contour and thickness, no edema     Today's testing:  none    Additional Ocular History:     2. Cataract, left eye    3. PCIOL right eye, sulcus lens  - surgery complicated by IFIS and iris prolapse    4. NLP right eye   Total RD     Plan/Recommendations:  Discussed findings with patient.  Patient with advanced glaucoma left eye with uncontrolled IOP requiring oral Diamox with evidence of progression over time, IOP in high 30's prior to tube shunt surgery on 08/11/2022, now status post left BGI surgery on 12/9/22. Avoided cataract surgery at time of glaucoma surgery given history of complication in the right eye, planned for cataract surgery after IOP is well controlled. Met with Dr. Drew in April 2024 and decided to not pursue surgery.   s/p left BGI 12/9/22, IOP well controlled   Continue latanoprost once daily at night each eye  Continue Cosopt BID each eye  Continue brimonidine TID each eye  Likely having some  dry eye issues, discussed using lubricating drops QID each eye.   Had long discussion with patient that tube surgery is not to improve vision but control IOP.   Discussed that decrease vision is attributed to severity of glaucoma and needs to get updated glasses prescribed by Dr. Valentin.     RTC in 3-4 months VA, IOP     Complete documentation of historical and exam elements  from today's encounter can be found in the full encounter summary report (not reduplicated in this progress note). I personally obtained the chief complaint(s) and history of present illness. I confirmed and edited as necessary the review of systems, past medical/surgical history, family history, social history, and examination findings as document by others; and I examined the patient myself. I personally reviewed the relevant tests, images, and reports as documented above. I formulated and edited as necessary the assessment and plan and discussed the findings and management plan with the patient and family.    Randal De Leon OD

## 2025-01-20 DIAGNOSIS — H40.1133 PRIMARY OPEN ANGLE GLAUCOMA OF BOTH EYES, SEVERE STAGE: ICD-10-CM

## 2025-01-21 ENCOUNTER — TELEPHONE (OUTPATIENT)
Dept: OPHTHALMOLOGY | Facility: CLINIC | Age: 65
End: 2025-01-21

## 2025-01-21 NOTE — TELEPHONE ENCOUNTER
Spoke to pt at 1500    Pt with cloudier/blurrier vision times one week-- progressively worsening per pt    Pt has appt in two days/thursday with Dr De Leon.    Reviewed with pt option to come in tomorrow and pt at this time will keep Thursday appt    Recommended lubricating eye drops and warm compresses.    Reviewed if felt vision continuing to worsen tomorrow AM to contact clinic for exam.    Pt verbally demonstrated understanding and seemed comfortable with information/plan.    Neil Frazier RN 3:21 PM 01/21/25

## 2025-01-21 NOTE — TELEPHONE ENCOUNTER
[facility] Call Center    Phone Message    May a detailed message be left on voicemail: yes     Reason for Call: Symptoms or Concerns     If patient has red-flag symptoms, warm transfer to triage line    Current symptom or concern: Blurry vision on left eye. Some cloudiness.     Symptoms have been present for:  1 week(s)    Has patient previously been seen for this? No    By : N/A    Date: N/A    Are there any new or worsening symptoms? Yes: Pt is saying the cloudiness is is getting worse.     Action Taken: Other: Eye    Travel Screening: Not Applicable     Date of Service:

## 2025-01-23 ENCOUNTER — OFFICE VISIT (OUTPATIENT)
Dept: OPHTHALMOLOGY | Facility: CLINIC | Age: 65
End: 2025-01-23
Payer: MEDICAID

## 2025-01-23 DIAGNOSIS — H40.1133 PRIMARY OPEN ANGLE GLAUCOMA OF BOTH EYES, SEVERE STAGE: ICD-10-CM

## 2025-01-23 PROCEDURE — G0463 HOSPITAL OUTPT CLINIC VISIT: HCPCS

## 2025-01-23 PROCEDURE — 92134 CPTRZ OPH DX IMG PST SGM RTA: CPT

## 2025-01-23 RX ORDER — DORZOLAMIDE HYDROCHLORIDE AND TIMOLOL MALEATE 20; 5 MG/ML; MG/ML
1 SOLUTION/ DROPS OPHTHALMIC 2 TIMES DAILY
Qty: 30 ML | Refills: 11 | Status: SHIPPED | OUTPATIENT
Start: 2025-01-23

## 2025-01-23 ASSESSMENT — CONF VISUAL FIELD
OD_SUPERIOR_TEMPORAL_RESTRICTION: 1
OS_SUPERIOR_TEMPORAL_RESTRICTION: 3
OD_INFERIOR_NASAL_RESTRICTION: 1
OS_INFERIOR_NASAL_RESTRICTION: 3
OS_SUPERIOR_NASAL_RESTRICTION: 3
OD_INFERIOR_TEMPORAL_RESTRICTION: 1
OD_SUPERIOR_NASAL_RESTRICTION: 1
OS_INFERIOR_TEMPORAL_RESTRICTION: 3

## 2025-01-23 ASSESSMENT — TONOMETRY
IOP_METHOD: APPLANATION
OS_IOP_MMHG: 12
IOP_METHOD: TONOPEN
OD_IOP_MMHG: 08
OS_IOP_MMHG: 11
OD_IOP_MMHG: 11

## 2025-01-23 ASSESSMENT — REFRACTION_WEARINGRX
OD_SPHERE: BALANCE
OS_ADD: +3.50
OD_ADD: +3.50
OS_CYLINDER: SPHERE
OS_SPHERE: -2.00

## 2025-01-23 ASSESSMENT — VISUAL ACUITY
OS_SC: 20/125
OD_SC: NLP
METHOD_MR: DECLINES MR TODAY
METHOD: SNELLEN - LINEAR

## 2025-01-23 ASSESSMENT — REFRACTION_MANIFEST
OS_SPHERE: -2.00
OD_SPHERE: BALANCE
OS_CYLINDER: SPHERE

## 2025-01-23 ASSESSMENT — EXTERNAL EXAM - LEFT EYE: OS_EXAM: NORMAL

## 2025-01-23 ASSESSMENT — CUP TO DISC RATIO: OS_RATIO: 0.9

## 2025-01-23 ASSESSMENT — SLIT LAMP EXAM - LIDS
COMMENTS: PTOSIS, FLOPPY
COMMENTS: PTOSIS, FLOPPY

## 2025-01-23 ASSESSMENT — EXTERNAL EXAM - RIGHT EYE: OD_EXAM: NORMAL

## 2025-01-23 NOTE — PROGRESS NOTES
Chief Complaint/Presenting Concern: severe POAG each eye, s/p Left BGI 12/9/22.     History of Present Illness:   Parvez Vásquez is a 61 year old patient who presents for evaluation of glaucoma.  Referred by Dr. Kurtz for evaluation of severe glaucoma in both eyes. Patient reports that he was diagnosed with glaucoma in both eyes in the mid-late 2000s. Patient has a complicated history of glaucoma and cataract surgeries in the right eye and now has NLP vision in the right eye. left eye has been progressing over the years but patient is hesitant to have surgery in the left eye due to poor outcome after surgery in the right eye. S/p left BGI 12/9/22     At previous visits patient had similar complaints from prior visits regarding vision in left eye being poor. He was referred to Dr. Drew for second opinion on cataract surgery. He met with him on 4/4/24 and elected not to proceed with cataract surgery and he was referred to [facility]. He saw Dr. Valentin on 4/11/24 and he was given a new prescription and was able to see 20/40, but has not had the chance to send the prescription to an optical shop.     Today, 01/23/2025, Patient complains of pain and vision issues. Similar to previous visits.    Current Medications:  Latanoprost once daily at night each eye  Cosopt BID each eye  Brimonidine TID each eye    Relevant Past Medical/Family/Social History:  PMH: Non ischemic cardiopmyopathy, htn, AVNRT s/p debrillator  FH: non contributory     Relevant Review of Systems: none     Diagnosis: Primary open angle glaucoma  severe left eye   Year diagnosis: 2007  Previous glaucoma surgery/laser:  - Ahmed right eye (2011, Marie)  - CEIOL, lysis of iridocorneal adhesions, IFIS, - trabeculectomy/[facility] right eye (2012, Marie)  - BGI 12/9/22 left eye  Maximum intraocular pressure: unknown/32 mmHg   Family history: negative  CCT: 55/3 /523  Gonio: open to scleral spur 360;   Refractive status: low myope  Trauma history:  negative  Steroid exposure: positive  Vasospastic disease: Migrane/Raynaud phenomenon: negative  A past hemodynamic crisis or Low BP: negative  Meds AEs/intolerance: unknown   PMHx: cardiomyopathy   Anticoagulants: none  Prior testing:  - Visual field August 11, 2022  - Right eye - unable  - Left eye - generalized depression high FP/FN  - OCT Macula August 11, 2022  - Right Eye: unable  - Left Eye: no subretinal or intraretinal fluid, complete PVD     OCT Optic Nerve RNFL Spectralis 1/30/24  - Right Eye: unable  - Left Eye: Diffuse RNFL thinning, floor effect        - OCT macula 1/30/24 left eye: normal retinal contour and thickness, no edema     Today's testing:  none    Additional Ocular History:     2. Cataract, left eye    3. PCIOL right eye, sulcus lens  - surgery complicated by IFIS and iris prolapse    4. NLP right eye   Total RD     Plan/Recommendations:  Discussed findings with patient.  Patient with advanced glaucoma left eye with uncontrolled IOP requiring oral Diamox with evidence of progression over time, IOP in high 30's prior to tube shunt surgery on 08/11/2022, now status post left BGI surgery on 12/9/22. Avoided cataract surgery at time of glaucoma surgery given history of complication in the right eye, planned for cataract surgery after IOP is well controlled. Met with Dr. Drew in April 2024 and decided to not pursue surgery.   s/p left BGI 12/9/22, IOP well controlled based on visit measurements. Question some compliance with medication due to patient stating sometimes has difficulty obtaining drops. Re-emphasized the importance of drop compliance.   Continue latanoprost once daily at night each eye  Continue Cosopt BID each eye  Continue brimonidine TID each eye  Reduced VA on today's exam, likely could be some cataract/ocular surface issues. Refraction got to him to 20/100. OCT Macula shows no abnormalities. Possibility snuffed out a portion of his field from glaucoma due to drop compliance.    Discussed using lubricating drops QID each eye, gave sample today.  Had long discussion with patient that tube surgery is not to improve vision but control IOP. Discussed that decrease vision is attributed to severity of glaucoma.  If still having issues with vision, consider sending for cataract consultation to improve vision.     RTC in 1 month for VT, LVC left eye.    Complete documentation of historical and exam elements from today's encounter can be found in the full encounter summary report (not reduplicated in this progress note). I personally obtained the chief complaint(s) and history of present illness. I confirmed and edited as necessary the review of systems, past medical/surgical history, family history, social history, and examination findings as document by others; and I examined the patient myself. I personally reviewed the relevant tests, images, and reports as documented above. I formulated and edited as necessary the assessment and plan and discussed the findings and management plan with the patient and family.    Randal De Leon, OD

## 2025-01-23 NOTE — NURSING NOTE
Chief Complaints and History of Present Illnesses   Patient presents with    Glaucoma Follow-Up     Chief Complaint(s) and History of Present Illness(es)       Glaucoma Follow-Up              Laterality: both eyes    Quality: Feels that the va has decreased    Associated symptoms: dryness, tearing and headache    Pain scale: 0/10              Comments    Here for Primary open angle glaucoma of both eyes, severe stage  Latanoprost once daily at night each eye  Blue BID each eye last taken couple days ago  Purple TID each eye  Has many questions on why there was sx done  Thea Bae COT 10:41 AM January 23, 2025

## 2025-03-10 DIAGNOSIS — H40.1133 PRIMARY OPEN ANGLE GLAUCOMA OF BOTH EYES, SEVERE STAGE: Primary | ICD-10-CM

## 2025-04-20 RX ORDER — DORZOLAMIDE HYDROCHLORIDE AND TIMOLOL MALEATE 20; 5 MG/ML; MG/ML
1 SOLUTION/ DROPS OPHTHALMIC 2 TIMES DAILY
Qty: 10 ML | Refills: 4 | OUTPATIENT
Start: 2025-04-20

## 2025-06-20 DIAGNOSIS — H40.1133 PRIMARY OPEN ANGLE GLAUCOMA (POAG) OF BOTH EYES, SEVERE STAGE: ICD-10-CM

## 2025-06-20 DIAGNOSIS — H40.1133 PRIMARY OPEN ANGLE GLAUCOMA OF BOTH EYES, SEVERE STAGE: ICD-10-CM

## 2025-06-30 RX ORDER — LATANOPROST 50 UG/ML
SOLUTION/ DROPS OPHTHALMIC
Qty: 2.5 ML | Refills: 11 | Status: SHIPPED | OUTPATIENT
Start: 2025-06-30

## 2025-06-30 NOTE — TELEPHONE ENCOUNTER
"latanoprost (XALATAN) 0.005 % ophthalmic solution   Start: 04/30/2024   Disp  2.5  ml   R  11  Place 1 drop into both eyes every evening     Randal De Leon, OD  Ophthalmology  Lv 1/23/25  Nv  none    \" Continue latanoprost once daily at night each eye \"     Medication refilled per  Medication Refill in Ambulatory Care  policy.       Request from pharmacy:  Requested Prescriptions   Pending Prescriptions Disp Refills    latanoprost (XALATAN) 0.005 % ophthalmic solution [Pharmacy Med Name: LATANOPROST 0.005% OPHTH SOLN 2.5ML] 2.5 mL 11     Sig: INSTILL 1 DROP IN BOTH EYES EVERY EVENING       There is no refill protocol information for this order                        "

## (undated) DEVICE — NDL 30GA 0.5" 305106

## (undated) DEVICE — LINEN TOWEL PACK X5 5464

## (undated) DEVICE — EYE TIP BIPOLAR 18GA ERASER 221250

## (undated) DEVICE — NDL 23GA 1" 305145

## (undated) DEVICE — ESU CORD BIPOLAR GREEN 10-4000

## (undated) DEVICE — POSITIONER ARMBOARD FOAM 1PAIR LF FP-ARMB1

## (undated) DEVICE — SYR 05ML LL W/O NDL

## (undated) DEVICE — EYE PREP BETADINE 5% SOLUTION 30ML 0065-0411-30

## (undated) DEVICE — DRSG EYE PAD STERILE 1.63X2.63" NON21600

## (undated) DEVICE — STRAP KNEE/BODY 31143004

## (undated) DEVICE — PACK CATARACT UMMC

## (undated) DEVICE — SPONGE SPEAR WECK CEL 6/PKG 0008680

## (undated) DEVICE — SYR 01ML LL W/O NDL LATEX FREE 309628

## (undated) DEVICE — EYE CANN IRR 27GA ANTERIOR CHAMBER 581280

## (undated) DEVICE — SU VICRYL 7-0 TG140-8DA 18" J546G

## (undated) DEVICE — SOL NACL 0.9% 10ML VIAL 0409-4888-02

## (undated) DEVICE — GLOVE BIOGEL PI MICRO SZ 7.0 48570

## (undated) DEVICE — ESU HOLSTER PLASTIC DISP E2400

## (undated) DEVICE — SU ETHILON 8-0 TG175-8 12" 1716G

## (undated) DEVICE — TAPE TRANSPORE 1"X1.5YD 1534S-1

## (undated) DEVICE — EYE SHIELD PLASTIC CLEAR UNIVERSAL K9-6050

## (undated) DEVICE — APPLICATORS COTTON TIP 6"X2 STERILE LF C15053-006

## (undated) RX ORDER — ACETAMINOPHEN 325 MG/1
TABLET ORAL
Status: DISPENSED
Start: 2022-12-09

## (undated) RX ORDER — BALANCED SALT SOLUTION 6.4; .75; .48; .3; 3.9; 1.7 MG/ML; MG/ML; MG/ML; MG/ML; MG/ML; MG/ML
SOLUTION OPHTHALMIC
Status: DISPENSED
Start: 2022-12-09

## (undated) RX ORDER — FENTANYL CITRATE-0.9 % NACL/PF 10 MCG/ML
PLASTIC BAG, INJECTION (ML) INTRAVENOUS
Status: DISPENSED
Start: 2022-12-09

## (undated) RX ORDER — FENTANYL CITRATE 50 UG/ML
INJECTION, SOLUTION INTRAMUSCULAR; INTRAVENOUS
Status: DISPENSED
Start: 2022-12-09